# Patient Record
Sex: MALE | Race: WHITE | NOT HISPANIC OR LATINO | Employment: FULL TIME | URBAN - METROPOLITAN AREA
[De-identification: names, ages, dates, MRNs, and addresses within clinical notes are randomized per-mention and may not be internally consistent; named-entity substitution may affect disease eponyms.]

---

## 2021-08-03 ENCOUNTER — TRANSCRIBE ORDERS (OUTPATIENT)
Dept: ADMINISTRATIVE | Facility: HOSPITAL | Age: 56
End: 2021-08-03

## 2021-08-03 DIAGNOSIS — N32.1 COLOVESICAL FISTULA: Primary | ICD-10-CM

## 2021-08-04 ENCOUNTER — APPOINTMENT (OUTPATIENT)
Dept: CT IMAGING | Facility: HOSPITAL | Age: 56
End: 2021-08-04

## 2021-08-04 ENCOUNTER — HOSPITAL ENCOUNTER (OUTPATIENT)
Dept: CT IMAGING | Facility: HOSPITAL | Age: 56
Discharge: HOME | End: 2021-08-04
Admitting: COLON & RECTAL SURGERY

## 2021-08-04 DIAGNOSIS — N32.1 COLOVESICAL FISTULA: ICD-10-CM

## 2021-08-04 PROCEDURE — 0 DIATRIZOATE MEGLUMINE & SODIUM PER 1 ML: Performed by: COLON & RECTAL SURGERY

## 2021-08-04 PROCEDURE — 25010000002 IOPAMIDOL 61 % SOLUTION: Performed by: COLON & RECTAL SURGERY

## 2021-08-04 PROCEDURE — 74177 CT ABD & PELVIS W/CONTRAST: CPT

## 2021-08-04 RX ADMIN — DIATRIZOATE MEGLUMINE AND DIATRIZOATE SODIUM 15 ML: 660; 100 LIQUID ORAL; RECTAL at 13:45

## 2021-08-04 RX ADMIN — IOPAMIDOL 100 ML: 612 INJECTION, SOLUTION INTRAVENOUS at 15:05

## 2021-08-16 ENCOUNTER — PRE-ADMISSION TESTING (OUTPATIENT)
Dept: PREADMISSION TESTING | Facility: HOSPITAL | Age: 56
End: 2021-08-16

## 2021-08-16 ENCOUNTER — ANESTHESIA EVENT (OUTPATIENT)
Dept: PERIOP | Facility: HOSPITAL | Age: 56
End: 2021-08-16

## 2021-08-16 VITALS — HEIGHT: 72 IN | WEIGHT: 256.62 LBS | BODY MASS INDEX: 34.76 KG/M2

## 2021-08-16 LAB
ABO GROUP BLD: NORMAL
ALBUMIN SERPL-MCNC: 4 G/DL (ref 3.5–5.2)
ALBUMIN/GLOB SERPL: 1.2 G/DL
ALP SERPL-CCNC: 91 U/L (ref 39–117)
ALT SERPL W P-5'-P-CCNC: 10 U/L (ref 1–41)
ANION GAP SERPL CALCULATED.3IONS-SCNC: 12 MMOL/L (ref 5–15)
AST SERPL-CCNC: 20 U/L (ref 1–40)
BILIRUB SERPL-MCNC: 0.4 MG/DL (ref 0–1.2)
BLD GP AB SCN SERPL QL: NEGATIVE
BUN SERPL-MCNC: 8 MG/DL (ref 6–20)
BUN/CREAT SERPL: 10.5 (ref 7–25)
CALCIUM SPEC-SCNC: 9.5 MG/DL (ref 8.6–10.5)
CHLORIDE SERPL-SCNC: 101 MMOL/L (ref 98–107)
CO2 SERPL-SCNC: 23 MMOL/L (ref 22–29)
CREAT SERPL-MCNC: 0.76 MG/DL (ref 0.76–1.27)
DEPRECATED RDW RBC AUTO: 44.2 FL (ref 37–54)
ERYTHROCYTE [DISTWIDTH] IN BLOOD BY AUTOMATED COUNT: 13.5 % (ref 12.3–15.4)
FLUAV SUBTYP SPEC NAA+PROBE: NOT DETECTED
FLUBV RNA ISLT QL NAA+PROBE: NOT DETECTED
GFR SERPL CREATININE-BSD FRML MDRD: 107 ML/MIN/1.73
GLOBULIN UR ELPH-MCNC: 3.3 GM/DL
GLUCOSE SERPL-MCNC: 164 MG/DL (ref 65–99)
HBA1C MFR BLD: 8 % (ref 4.8–5.6)
HCT VFR BLD AUTO: 51.3 % (ref 37.5–51)
HGB BLD-MCNC: 17 G/DL (ref 13–17.7)
MCH RBC QN AUTO: 29.6 PG (ref 26.6–33)
MCHC RBC AUTO-ENTMCNC: 33.1 G/DL (ref 31.5–35.7)
MCV RBC AUTO: 89.4 FL (ref 79–97)
PLATELET # BLD AUTO: 252 10*3/MM3 (ref 140–450)
PMV BLD AUTO: 10.2 FL (ref 6–12)
POTASSIUM SERPL-SCNC: 3.7 MMOL/L (ref 3.5–5.2)
PROT SERPL-MCNC: 7.3 G/DL (ref 6–8.5)
QT INTERVAL: 342 MS
QTC INTERVAL: 454 MS
RBC # BLD AUTO: 5.74 10*6/MM3 (ref 4.14–5.8)
RH BLD: POSITIVE
SARS-COV-2 RNA PNL SPEC NAA+PROBE: NOT DETECTED
SODIUM SERPL-SCNC: 136 MMOL/L (ref 136–145)
T&S EXPIRATION DATE: NORMAL
WBC # BLD AUTO: 14.83 10*3/MM3 (ref 3.4–10.8)

## 2021-08-16 PROCEDURE — 85027 COMPLETE CBC AUTOMATED: CPT

## 2021-08-16 PROCEDURE — 80053 COMPREHEN METABOLIC PANEL: CPT

## 2021-08-16 PROCEDURE — 86900 BLOOD TYPING SEROLOGIC ABO: CPT

## 2021-08-16 PROCEDURE — 36415 COLL VENOUS BLD VENIPUNCTURE: CPT

## 2021-08-16 PROCEDURE — 83036 HEMOGLOBIN GLYCOSYLATED A1C: CPT

## 2021-08-16 PROCEDURE — 93010 ELECTROCARDIOGRAM REPORT: CPT | Performed by: INTERNAL MEDICINE

## 2021-08-16 PROCEDURE — C9803 HOPD COVID-19 SPEC COLLECT: HCPCS

## 2021-08-16 PROCEDURE — 93005 ELECTROCARDIOGRAM TRACING: CPT

## 2021-08-16 PROCEDURE — 86901 BLOOD TYPING SEROLOGIC RH(D): CPT

## 2021-08-16 PROCEDURE — 86850 RBC ANTIBODY SCREEN: CPT

## 2021-08-16 PROCEDURE — 87636 SARSCOV2 & INF A&B AMP PRB: CPT

## 2021-08-16 RX ORDER — LEVOFLOXACIN 750 MG/1
750 TABLET ORAL DAILY
COMMUNITY
End: 2021-08-20 | Stop reason: HOSPADM

## 2021-08-16 RX ORDER — METRONIDAZOLE 500 MG/1
500 TABLET ORAL 3 TIMES DAILY
COMMUNITY
End: 2021-08-20 | Stop reason: HOSPADM

## 2021-08-16 RX ORDER — FAMOTIDINE 10 MG/ML
20 INJECTION, SOLUTION INTRAVENOUS ONCE
Status: CANCELLED | OUTPATIENT
Start: 2021-08-16 | End: 2021-08-16

## 2021-08-16 RX ORDER — NICOTINE 21 MG/24HR
1 PATCH, TRANSDERMAL 24 HOURS TRANSDERMAL EVERY 24 HOURS
COMMUNITY

## 2021-08-16 RX ORDER — ALLOPURINOL 100 MG/1
100 TABLET ORAL DAILY
COMMUNITY

## 2021-08-16 RX ORDER — AMLODIPINE BESYLATE 5 MG/1
5 TABLET ORAL DAILY
COMMUNITY

## 2021-08-16 NOTE — PAT
Patient to apply Chlorhexadine wipes  to surgical area (as instructed) the night before procedure and the AM of procedure. Wipes provided.    Blood bank bracelet applied to patient during Pre Admission Testing visit.  Patient instructed not to remove from arm until after procedure and they are discharged from the hospital.  Explained to patient that they may shower and get the bracelet wet, but not to immerse under water for longer periods (bathing, swimming, hand dishwashing, etc).  Patient verbalized understanding.    EKG in chart.    Stoma nurse seeing patient now.       Left  for Jessy Little with information about patient's surgery.

## 2021-08-17 ENCOUNTER — HOSPITAL ENCOUNTER (INPATIENT)
Facility: HOSPITAL | Age: 56
LOS: 3 days | Discharge: HOME HEALTH | End: 2021-08-20
Attending: COLON & RECTAL SURGERY | Admitting: COLON & RECTAL SURGERY

## 2021-08-17 ENCOUNTER — ANESTHESIA EVENT CONVERTED (OUTPATIENT)
Dept: ANESTHESIOLOGY | Facility: HOSPITAL | Age: 56
End: 2021-08-17

## 2021-08-17 ENCOUNTER — ANESTHESIA (OUTPATIENT)
Dept: PERIOP | Facility: HOSPITAL | Age: 56
End: 2021-08-17

## 2021-08-17 DIAGNOSIS — Z90.49 S/P COLON RESECTION: Primary | ICD-10-CM

## 2021-08-17 DIAGNOSIS — N32.1 COLOVESICAL FISTULA: ICD-10-CM

## 2021-08-17 DIAGNOSIS — D72.829 LEUKOCYTOSIS, UNSPECIFIED TYPE: ICD-10-CM

## 2021-08-17 PROBLEM — E11.9 DM2 (DIABETES MELLITUS, TYPE 2): Status: ACTIVE | Noted: 2021-08-17

## 2021-08-17 PROBLEM — I10 HYPERTENSION: Status: ACTIVE | Noted: 2021-08-17

## 2021-08-17 PROBLEM — K57.92 DIVERTICULITIS: Status: ACTIVE | Noted: 2021-08-17

## 2021-08-17 LAB
ABO GROUP BLD: NORMAL
GLUCOSE BLDC GLUCOMTR-MCNC: 183 MG/DL (ref 70–130)
GLUCOSE BLDC GLUCOMTR-MCNC: 188 MG/DL (ref 70–130)
GLUCOSE BLDC GLUCOMTR-MCNC: 233 MG/DL (ref 70–130)
RH BLD: POSITIVE

## 2021-08-17 PROCEDURE — C1889 IMPLANT/INSERT DEVICE, NOC: HCPCS | Performed by: COLON & RECTAL SURGERY

## 2021-08-17 PROCEDURE — 87116 MYCOBACTERIA CULTURE: CPT | Performed by: COLON & RECTAL SURGERY

## 2021-08-17 PROCEDURE — 63710000001 INSULIN DETEMIR PER 5 UNITS

## 2021-08-17 PROCEDURE — 87077 CULTURE AEROBIC IDENTIFY: CPT | Performed by: COLON & RECTAL SURGERY

## 2021-08-17 PROCEDURE — 25010000002 NEOSTIGMINE 10 MG/10ML SOLUTION: Performed by: NURSE ANESTHETIST, CERTIFIED REGISTERED

## 2021-08-17 PROCEDURE — 25010000002 FENTANYL CITRATE (PF) 50 MCG/ML SOLUTION: Performed by: NURSE ANESTHETIST, CERTIFIED REGISTERED

## 2021-08-17 PROCEDURE — 87070 CULTURE OTHR SPECIMN AEROBIC: CPT | Performed by: COLON & RECTAL SURGERY

## 2021-08-17 PROCEDURE — 0DTJ4ZZ RESECTION OF APPENDIX, PERCUTANEOUS ENDOSCOPIC APPROACH: ICD-10-PCS | Performed by: COLON & RECTAL SURGERY

## 2021-08-17 PROCEDURE — P9041 ALBUMIN (HUMAN),5%, 50ML: HCPCS | Performed by: NURSE ANESTHETIST, CERTIFIED REGISTERED

## 2021-08-17 PROCEDURE — 0DBN4ZZ EXCISION OF SIGMOID COLON, PERCUTANEOUS ENDOSCOPIC APPROACH: ICD-10-PCS | Performed by: COLON & RECTAL SURGERY

## 2021-08-17 PROCEDURE — 25010000002 SODIUM CHLORIDE 0.9 % WITH KCL 20 MEQ 20-0.9 MEQ/L-% SOLUTION: Performed by: COLON & RECTAL SURGERY

## 2021-08-17 PROCEDURE — 87205 SMEAR GRAM STAIN: CPT | Performed by: COLON & RECTAL SURGERY

## 2021-08-17 PROCEDURE — 0T788DZ DILATION OF BILATERAL URETERS WITH INTRALUMINAL DEVICE, VIA NATURAL OR ARTIFICIAL OPENING ENDOSCOPIC: ICD-10-PCS | Performed by: UROLOGY

## 2021-08-17 PROCEDURE — 87075 CULTR BACTERIA EXCEPT BLOOD: CPT | Performed by: COLON & RECTAL SURGERY

## 2021-08-17 PROCEDURE — 87206 SMEAR FLUORESCENT/ACID STAI: CPT | Performed by: COLON & RECTAL SURGERY

## 2021-08-17 PROCEDURE — 25010000002 HEPARIN (PORCINE) PER 1000 UNITS: Performed by: COLON & RECTAL SURGERY

## 2021-08-17 PROCEDURE — C1758 CATHETER, URETERAL: HCPCS | Performed by: COLON & RECTAL SURGERY

## 2021-08-17 PROCEDURE — 25010000002 PROPOFOL 10 MG/ML EMULSION: Performed by: NURSE ANESTHETIST, CERTIFIED REGISTERED

## 2021-08-17 PROCEDURE — 87176 TISSUE HOMOGENIZATION CULTR: CPT | Performed by: COLON & RECTAL SURGERY

## 2021-08-17 PROCEDURE — 02HV33Z INSERTION OF INFUSION DEVICE INTO SUPERIOR VENA CAVA, PERCUTANEOUS APPROACH: ICD-10-PCS | Performed by: INTERNAL MEDICINE

## 2021-08-17 PROCEDURE — 25010000002 PIPERACILLIN SOD-TAZOBACTAM PER 1 G: Performed by: COLON & RECTAL SURGERY

## 2021-08-17 PROCEDURE — 87186 SC STD MICRODIL/AGAR DIL: CPT | Performed by: COLON & RECTAL SURGERY

## 2021-08-17 PROCEDURE — 25010000002 HYDROMORPHONE PER 4 MG: Performed by: NURSE ANESTHETIST, CERTIFIED REGISTERED

## 2021-08-17 PROCEDURE — 25010000002 ALBUMIN HUMAN 5% PER 50 ML: Performed by: NURSE ANESTHETIST, CERTIFIED REGISTERED

## 2021-08-17 PROCEDURE — 0D1B0Z4 BYPASS ILEUM TO CUTANEOUS, OPEN APPROACH: ICD-10-PCS | Performed by: COLON & RECTAL SURGERY

## 2021-08-17 PROCEDURE — 88307 TISSUE EXAM BY PATHOLOGIST: CPT | Performed by: COLON & RECTAL SURGERY

## 2021-08-17 PROCEDURE — 25010000002 DIAZEPAM PER 5 MG: Performed by: INTERNAL MEDICINE

## 2021-08-17 PROCEDURE — 25010000002 HYDROMORPHONE PER 4 MG: Performed by: COLON & RECTAL SURGERY

## 2021-08-17 PROCEDURE — 86900 BLOOD TYPING SEROLOGIC ABO: CPT

## 2021-08-17 PROCEDURE — 25010000002 MIDAZOLAM PER 1 MG: Performed by: ANESTHESIOLOGY

## 2021-08-17 PROCEDURE — 88302 TISSUE EXAM BY PATHOLOGIST: CPT | Performed by: COLON & RECTAL SURGERY

## 2021-08-17 PROCEDURE — 63710000001 INSULIN LISPRO (HUMAN) PER 5 UNITS

## 2021-08-17 PROCEDURE — 25010000002 DEXAMETHASONE SODIUM PHOSPHATE 10 MG/ML SOLUTION: Performed by: NURSE ANESTHETIST, CERTIFIED REGISTERED

## 2021-08-17 PROCEDURE — 87102 FUNGUS ISOLATION CULTURE: CPT | Performed by: COLON & RECTAL SURGERY

## 2021-08-17 PROCEDURE — 25010000002 ONDANSETRON PER 1 MG: Performed by: COLON & RECTAL SURGERY

## 2021-08-17 PROCEDURE — 82962 GLUCOSE BLOOD TEST: CPT

## 2021-08-17 PROCEDURE — 25010000002 ONDANSETRON PER 1 MG: Performed by: NURSE ANESTHETIST, CERTIFIED REGISTERED

## 2021-08-17 PROCEDURE — 86901 BLOOD TYPING SEROLOGIC RH(D): CPT

## 2021-08-17 PROCEDURE — 0TBB4ZZ EXCISION OF BLADDER, PERCUTANEOUS ENDOSCOPIC APPROACH: ICD-10-PCS | Performed by: COLON & RECTAL SURGERY

## 2021-08-17 PROCEDURE — 25010000002 ERTAPENEM 1 GM/100ML SOLUTION: Performed by: COLON & RECTAL SURGERY

## 2021-08-17 PROCEDURE — 25010000002 KETOROLAC TROMETHAMINE PER 15 MG: Performed by: COLON & RECTAL SURGERY

## 2021-08-17 PROCEDURE — 94799 UNLISTED PULMONARY SVC/PX: CPT

## 2021-08-17 DEVICE — THE ECHELON, ECHELON ENDOPATH™ AND ECHELON FLEX™ FAMILIES OF ENDOSCOPIC LINEAR CUTTERS AND RELOADS ARE STERILE, SINGLE PATIENT USE INSTRUMENTS THAT SIMULTANEOUSLY CUT AND STAPLE TISSUE. THERE ARE SIX STAGGERED ROWS OF STAPLES, THREE ON EITHER SIDE OF THE CUT LINE. THE 45 MM INSTRUMENTS HAVE A STAPLE LINE THATIS APPROXIMATELY 45 MM LONG AND A CUT LINE THAT IS APPROXIMATELY 42 MM LONG. THE SHAFT CAN ROTATE FREELY IN BOTH DIRECTIONS AND AN ARTICULATION MECHANISM ON ARTICULATING INSTRUMENTS ENABLES BENDING THE DISTAL PORTIONOF THE SHAFT TO FACILITATE LATERAL ACCESS OF THE OPERATIVE SITE.THE INSTRUMENTS ARE SHIPPED WITHOUT A RELOAD AND MUST BE LOADED PRIOR TO USE. A STAPLE RETAINING CAP ON THE RELOAD PROTECTS THE STAPLE LEG POINTS DURING SHIPPING AND TRANSPORTATION. THE INSTRUMENTS’ LOCK-OUT FEATURE IS DESIGNED TO PREVENT A USED RELOAD FROM BEING REFIRED.
Type: IMPLANTABLE DEVICE | Site: ABDOMEN | Status: FUNCTIONAL
Brand: ECHELON ENDOPATH

## 2021-08-17 DEVICE — CELLULAR STAPLER WITH TRI-STAPLE TECHNOLOGY
Type: IMPLANTABLE DEVICE | Site: ABDOMEN | Status: FUNCTIONAL
Brand: EEA

## 2021-08-17 RX ORDER — MELOXICAM 15 MG/1
15 TABLET ORAL ONCE
Status: COMPLETED | OUTPATIENT
Start: 2021-08-17 | End: 2021-08-17

## 2021-08-17 RX ORDER — IBUPROFEN 400 MG/1
400 TABLET ORAL EVERY 6 HOURS PRN
Status: DISCONTINUED | OUTPATIENT
Start: 2021-08-17 | End: 2021-08-20 | Stop reason: HOSPADM

## 2021-08-17 RX ORDER — ONDANSETRON 2 MG/ML
INJECTION INTRAMUSCULAR; INTRAVENOUS AS NEEDED
Status: DISCONTINUED | OUTPATIENT
Start: 2021-08-17 | End: 2021-08-17 | Stop reason: SURG

## 2021-08-17 RX ORDER — ONDANSETRON 2 MG/ML
4 INJECTION INTRAMUSCULAR; INTRAVENOUS EVERY 6 HOURS PRN
Status: DISCONTINUED | OUTPATIENT
Start: 2021-08-17 | End: 2021-08-17 | Stop reason: SDUPTHER

## 2021-08-17 RX ORDER — LABETALOL HYDROCHLORIDE 5 MG/ML
10 INJECTION, SOLUTION INTRAVENOUS EVERY 4 HOURS PRN
Status: DISCONTINUED | OUTPATIENT
Start: 2021-08-17 | End: 2021-08-20 | Stop reason: HOSPADM

## 2021-08-17 RX ORDER — FAMOTIDINE 20 MG/1
20 TABLET, FILM COATED ORAL 2 TIMES DAILY
Status: DISCONTINUED | OUTPATIENT
Start: 2021-08-17 | End: 2021-08-20 | Stop reason: HOSPADM

## 2021-08-17 RX ORDER — HYDROCODONE BITARTRATE AND ACETAMINOPHEN 7.5; 325 MG/1; MG/1
1 TABLET ORAL EVERY 4 HOURS PRN
Status: DISCONTINUED | OUTPATIENT
Start: 2021-08-17 | End: 2021-08-20 | Stop reason: HOSPADM

## 2021-08-17 RX ORDER — SODIUM CHLORIDE 0.9 % (FLUSH) 0.9 %
10 SYRINGE (ML) INJECTION EVERY 12 HOURS SCHEDULED
Status: DISCONTINUED | OUTPATIENT
Start: 2021-08-17 | End: 2021-08-17 | Stop reason: HOSPADM

## 2021-08-17 RX ORDER — DIAZEPAM 5 MG/1
5 TABLET ORAL EVERY 6 HOURS PRN
Status: DISCONTINUED | OUTPATIENT
Start: 2021-08-17 | End: 2021-08-20 | Stop reason: HOSPADM

## 2021-08-17 RX ORDER — LIDOCAINE HYDROCHLORIDE 10 MG/ML
0.5 INJECTION, SOLUTION EPIDURAL; INFILTRATION; INTRACAUDAL; PERINEURAL ONCE AS NEEDED
Status: COMPLETED | OUTPATIENT
Start: 2021-08-17 | End: 2021-08-17

## 2021-08-17 RX ORDER — PROMETHAZINE HYDROCHLORIDE 25 MG/1
25 TABLET ORAL ONCE AS NEEDED
Status: DISCONTINUED | OUTPATIENT
Start: 2021-08-17 | End: 2021-08-17 | Stop reason: HOSPADM

## 2021-08-17 RX ORDER — ONDANSETRON 2 MG/ML
4 INJECTION INTRAMUSCULAR; INTRAVENOUS EVERY 6 HOURS PRN
Status: DISCONTINUED | OUTPATIENT
Start: 2021-08-17 | End: 2021-08-20 | Stop reason: HOSPADM

## 2021-08-17 RX ORDER — ROCURONIUM BROMIDE 10 MG/ML
INJECTION, SOLUTION INTRAVENOUS AS NEEDED
Status: DISCONTINUED | OUTPATIENT
Start: 2021-08-17 | End: 2021-08-17 | Stop reason: SURG

## 2021-08-17 RX ORDER — BUPIVACAINE HCL/0.9 % NACL/PF 0.125 %
PLASTIC BAG, INJECTION (ML) EPIDURAL AS NEEDED
Status: DISCONTINUED | OUTPATIENT
Start: 2021-08-17 | End: 2021-08-17 | Stop reason: SURG

## 2021-08-17 RX ORDER — DROPERIDOL 2.5 MG/ML
0.62 INJECTION, SOLUTION INTRAMUSCULAR; INTRAVENOUS AS NEEDED
Status: DISCONTINUED | OUTPATIENT
Start: 2021-08-17 | End: 2021-08-17 | Stop reason: HOSPADM

## 2021-08-17 RX ORDER — KETOROLAC TROMETHAMINE 15 MG/ML
15 INJECTION, SOLUTION INTRAMUSCULAR; INTRAVENOUS EVERY 6 HOURS
Status: COMPLETED | OUTPATIENT
Start: 2021-08-17 | End: 2021-08-19

## 2021-08-17 RX ORDER — SODIUM CHLORIDE AND POTASSIUM CHLORIDE 150; 900 MG/100ML; MG/100ML
100 INJECTION, SOLUTION INTRAVENOUS CONTINUOUS
Status: DISCONTINUED | OUTPATIENT
Start: 2021-08-17 | End: 2021-08-20 | Stop reason: HOSPADM

## 2021-08-17 RX ORDER — DROPERIDOL 2.5 MG/ML
0.62 INJECTION, SOLUTION INTRAMUSCULAR; INTRAVENOUS ONCE AS NEEDED
Status: DISCONTINUED | OUTPATIENT
Start: 2021-08-17 | End: 2021-08-17 | Stop reason: HOSPADM

## 2021-08-17 RX ORDER — ACETAMINOPHEN 500 MG
1000 TABLET ORAL ONCE
Status: COMPLETED | OUTPATIENT
Start: 2021-08-17 | End: 2021-08-17

## 2021-08-17 RX ORDER — ALBUMIN, HUMAN INJ 5% 5 %
SOLUTION INTRAVENOUS CONTINUOUS PRN
Status: DISCONTINUED | OUTPATIENT
Start: 2021-08-17 | End: 2021-08-17 | Stop reason: SURG

## 2021-08-17 RX ORDER — ALLOPURINOL 100 MG/1
100 TABLET ORAL DAILY
Status: DISCONTINUED | OUTPATIENT
Start: 2021-08-18 | End: 2021-08-20 | Stop reason: HOSPADM

## 2021-08-17 RX ORDER — ONDANSETRON 2 MG/ML
4 INJECTION INTRAMUSCULAR; INTRAVENOUS ONCE AS NEEDED
Status: DISCONTINUED | OUTPATIENT
Start: 2021-08-17 | End: 2021-08-17 | Stop reason: HOSPADM

## 2021-08-17 RX ORDER — IPRATROPIUM BROMIDE AND ALBUTEROL SULFATE 2.5; .5 MG/3ML; MG/3ML
3 SOLUTION RESPIRATORY (INHALATION) ONCE AS NEEDED
Status: DISCONTINUED | OUTPATIENT
Start: 2021-08-17 | End: 2021-08-17 | Stop reason: HOSPADM

## 2021-08-17 RX ORDER — NALOXONE HCL 0.4 MG/ML
0.4 VIAL (ML) INJECTION AS NEEDED
Status: DISCONTINUED | OUTPATIENT
Start: 2021-08-17 | End: 2021-08-17 | Stop reason: HOSPADM

## 2021-08-17 RX ORDER — MAGNESIUM HYDROXIDE 1200 MG/15ML
LIQUID ORAL AS NEEDED
Status: DISCONTINUED | OUTPATIENT
Start: 2021-08-17 | End: 2021-08-17 | Stop reason: HOSPADM

## 2021-08-17 RX ORDER — SODIUM CHLORIDE 0.9 % (FLUSH) 0.9 %
3 SYRINGE (ML) INJECTION EVERY 12 HOURS SCHEDULED
Status: DISCONTINUED | OUTPATIENT
Start: 2021-08-17 | End: 2021-08-17 | Stop reason: HOSPADM

## 2021-08-17 RX ORDER — PROMETHAZINE HYDROCHLORIDE 25 MG/1
25 SUPPOSITORY RECTAL ONCE AS NEEDED
Status: DISCONTINUED | OUTPATIENT
Start: 2021-08-17 | End: 2021-08-17 | Stop reason: HOSPADM

## 2021-08-17 RX ORDER — DEXTROSE MONOHYDRATE 25 G/50ML
25 INJECTION, SOLUTION INTRAVENOUS
Status: DISCONTINUED | OUTPATIENT
Start: 2021-08-17 | End: 2021-08-20 | Stop reason: HOSPADM

## 2021-08-17 RX ORDER — LABETALOL HYDROCHLORIDE 5 MG/ML
5 INJECTION, SOLUTION INTRAVENOUS
Status: DISCONTINUED | OUTPATIENT
Start: 2021-08-17 | End: 2021-08-17 | Stop reason: HOSPADM

## 2021-08-17 RX ORDER — DEXAMETHASONE SODIUM PHOSPHATE 10 MG/ML
INJECTION, SOLUTION INTRAMUSCULAR; INTRAVENOUS
Status: COMPLETED | OUTPATIENT
Start: 2021-08-17 | End: 2021-08-17

## 2021-08-17 RX ORDER — GLYCOPYRROLATE 0.2 MG/ML
INJECTION INTRAMUSCULAR; INTRAVENOUS AS NEEDED
Status: DISCONTINUED | OUTPATIENT
Start: 2021-08-17 | End: 2021-08-17 | Stop reason: SURG

## 2021-08-17 RX ORDER — HYDROCODONE BITARTRATE AND ACETAMINOPHEN 5; 325 MG/1; MG/1
1 TABLET ORAL ONCE AS NEEDED
Status: DISCONTINUED | OUTPATIENT
Start: 2021-08-17 | End: 2021-08-17 | Stop reason: HOSPADM

## 2021-08-17 RX ORDER — SODIUM CHLORIDE, SODIUM LACTATE, POTASSIUM CHLORIDE, CALCIUM CHLORIDE 600; 310; 30; 20 MG/100ML; MG/100ML; MG/100ML; MG/100ML
9 INJECTION, SOLUTION INTRAVENOUS CONTINUOUS
Status: DISCONTINUED | OUTPATIENT
Start: 2021-08-17 | End: 2021-08-20 | Stop reason: HOSPADM

## 2021-08-17 RX ORDER — FENTANYL CITRATE 50 UG/ML
INJECTION, SOLUTION INTRAMUSCULAR; INTRAVENOUS AS NEEDED
Status: DISCONTINUED | OUTPATIENT
Start: 2021-08-17 | End: 2021-08-17 | Stop reason: SURG

## 2021-08-17 RX ORDER — PROPOFOL 10 MG/ML
VIAL (ML) INTRAVENOUS AS NEEDED
Status: DISCONTINUED | OUTPATIENT
Start: 2021-08-17 | End: 2021-08-17 | Stop reason: SURG

## 2021-08-17 RX ORDER — HYDROMORPHONE HYDROCHLORIDE 1 MG/ML
0.5 INJECTION, SOLUTION INTRAMUSCULAR; INTRAVENOUS; SUBCUTANEOUS
Status: DISCONTINUED | OUTPATIENT
Start: 2021-08-17 | End: 2021-08-17 | Stop reason: HOSPADM

## 2021-08-17 RX ORDER — HEPARIN SODIUM 5000 [USP'U]/ML
5000 INJECTION, SOLUTION INTRAVENOUS; SUBCUTANEOUS ONCE
Status: COMPLETED | OUTPATIENT
Start: 2021-08-17 | End: 2021-08-17

## 2021-08-17 RX ORDER — HYDRALAZINE HYDROCHLORIDE 20 MG/ML
5 INJECTION INTRAMUSCULAR; INTRAVENOUS
Status: DISCONTINUED | OUTPATIENT
Start: 2021-08-17 | End: 2021-08-17 | Stop reason: HOSPADM

## 2021-08-17 RX ORDER — SCOLOPAMINE TRANSDERMAL SYSTEM 1 MG/1
1 PATCH, EXTENDED RELEASE TRANSDERMAL CONTINUOUS
Status: ACTIVE | OUTPATIENT
Start: 2021-08-17 | End: 2021-08-20

## 2021-08-17 RX ORDER — DIAZEPAM 5 MG/ML
5 INJECTION, SOLUTION INTRAMUSCULAR; INTRAVENOUS ONCE
Status: COMPLETED | OUTPATIENT
Start: 2021-08-17 | End: 2021-08-17

## 2021-08-17 RX ORDER — HEPARIN SODIUM 5000 [USP'U]/ML
5000 INJECTION, SOLUTION INTRAVENOUS; SUBCUTANEOUS EVERY 8 HOURS SCHEDULED
Status: DISCONTINUED | OUTPATIENT
Start: 2021-08-18 | End: 2021-08-20 | Stop reason: HOSPADM

## 2021-08-17 RX ORDER — ONDANSETRON 4 MG/1
4 TABLET, FILM COATED ORAL EVERY 6 HOURS PRN
Status: DISCONTINUED | OUTPATIENT
Start: 2021-08-17 | End: 2021-08-20 | Stop reason: HOSPADM

## 2021-08-17 RX ORDER — AMLODIPINE BESYLATE 5 MG/1
5 TABLET ORAL DAILY
Status: DISCONTINUED | OUTPATIENT
Start: 2021-08-18 | End: 2021-08-20 | Stop reason: HOSPADM

## 2021-08-17 RX ORDER — FAMOTIDINE 20 MG/1
20 TABLET, FILM COATED ORAL ONCE
Status: COMPLETED | OUTPATIENT
Start: 2021-08-17 | End: 2021-08-17

## 2021-08-17 RX ORDER — SODIUM CHLORIDE 0.9 % (FLUSH) 0.9 %
3-10 SYRINGE (ML) INJECTION AS NEEDED
Status: DISCONTINUED | OUTPATIENT
Start: 2021-08-17 | End: 2021-08-17 | Stop reason: HOSPADM

## 2021-08-17 RX ORDER — FENTANYL CITRATE 50 UG/ML
50 INJECTION, SOLUTION INTRAMUSCULAR; INTRAVENOUS
Status: COMPLETED | OUTPATIENT
Start: 2021-08-17 | End: 2021-08-17

## 2021-08-17 RX ORDER — NEOSTIGMINE METHYLSULFATE 1 MG/ML
INJECTION, SOLUTION INTRAVENOUS AS NEEDED
Status: DISCONTINUED | OUTPATIENT
Start: 2021-08-17 | End: 2021-08-17 | Stop reason: SURG

## 2021-08-17 RX ORDER — MEPERIDINE HYDROCHLORIDE 25 MG/ML
12.5 INJECTION INTRAMUSCULAR; INTRAVENOUS; SUBCUTANEOUS
Status: DISCONTINUED | OUTPATIENT
Start: 2021-08-17 | End: 2021-08-17 | Stop reason: HOSPADM

## 2021-08-17 RX ORDER — ALVIMOPAN 12 MG/1
12 CAPSULE ORAL ONCE
Status: COMPLETED | OUTPATIENT
Start: 2021-08-17 | End: 2021-08-17

## 2021-08-17 RX ORDER — BUPIVACAINE HYDROCHLORIDE 2.5 MG/ML
INJECTION, SOLUTION EPIDURAL; INFILTRATION; INTRACAUDAL
Status: COMPLETED | OUTPATIENT
Start: 2021-08-17 | End: 2021-08-17

## 2021-08-17 RX ORDER — NICOTINE 21 MG/24HR
1 PATCH, TRANSDERMAL 24 HOURS TRANSDERMAL DAILY
Status: DISCONTINUED | OUTPATIENT
Start: 2021-08-18 | End: 2021-08-20 | Stop reason: HOSPADM

## 2021-08-17 RX ORDER — NICOTINE POLACRILEX 4 MG
15 LOZENGE BUCCAL
Status: DISCONTINUED | OUTPATIENT
Start: 2021-08-17 | End: 2021-08-20 | Stop reason: HOSPADM

## 2021-08-17 RX ORDER — NALOXONE HCL 0.4 MG/ML
0.4 VIAL (ML) INJECTION
Status: DISCONTINUED | OUTPATIENT
Start: 2021-08-17 | End: 2021-08-20 | Stop reason: HOSPADM

## 2021-08-17 RX ORDER — SODIUM CHLORIDE 9 MG/ML
INJECTION, SOLUTION INTRAVENOUS AS NEEDED
Status: DISCONTINUED | OUTPATIENT
Start: 2021-08-17 | End: 2021-08-17 | Stop reason: HOSPADM

## 2021-08-17 RX ORDER — HYDROMORPHONE HYDROCHLORIDE 1 MG/ML
0.5 INJECTION, SOLUTION INTRAMUSCULAR; INTRAVENOUS; SUBCUTANEOUS
Status: DISCONTINUED | OUTPATIENT
Start: 2021-08-17 | End: 2021-08-20 | Stop reason: HOSPADM

## 2021-08-17 RX ORDER — SODIUM CHLORIDE 0.9 % (FLUSH) 0.9 %
10 SYRINGE (ML) INJECTION AS NEEDED
Status: DISCONTINUED | OUTPATIENT
Start: 2021-08-17 | End: 2021-08-17 | Stop reason: HOSPADM

## 2021-08-17 RX ORDER — MIDAZOLAM HYDROCHLORIDE 1 MG/ML
1 INJECTION INTRAMUSCULAR; INTRAVENOUS
Status: COMPLETED | OUTPATIENT
Start: 2021-08-17 | End: 2021-08-17

## 2021-08-17 RX ORDER — LIDOCAINE HYDROCHLORIDE 10 MG/ML
INJECTION, SOLUTION EPIDURAL; INFILTRATION; INTRACAUDAL; PERINEURAL AS NEEDED
Status: DISCONTINUED | OUTPATIENT
Start: 2021-08-17 | End: 2021-08-17 | Stop reason: SURG

## 2021-08-17 RX ORDER — ACETAMINOPHEN 325 MG/1
650 TABLET ORAL EVERY 8 HOURS
Status: DISCONTINUED | OUTPATIENT
Start: 2021-08-17 | End: 2021-08-20 | Stop reason: HOSPADM

## 2021-08-17 RX ORDER — PREGABALIN 75 MG/1
75 CAPSULE ORAL ONCE
Status: COMPLETED | OUTPATIENT
Start: 2021-08-17 | End: 2021-08-17

## 2021-08-17 RX ADMIN — FENTANYL CITRATE 50 MCG: 50 INJECTION INTRAMUSCULAR; INTRAVENOUS at 11:45

## 2021-08-17 RX ADMIN — ACETAMINOPHEN 650 MG: 325 TABLET, FILM COATED ORAL at 16:32

## 2021-08-17 RX ADMIN — TAZOBACTAM SODIUM AND PIPERACILLIN SODIUM 3.38 G: 375; 3 INJECTION, SOLUTION INTRAVENOUS at 19:47

## 2021-08-17 RX ADMIN — PREGABALIN 75 MG: 75 CAPSULE ORAL at 06:59

## 2021-08-17 RX ADMIN — INSULIN LISPRO 3 UNITS: 100 INJECTION, SOLUTION INTRAVENOUS; SUBCUTANEOUS at 18:30

## 2021-08-17 RX ADMIN — ALBUMIN HUMAN: 0.05 INJECTION, SOLUTION INTRAVENOUS at 08:41

## 2021-08-17 RX ADMIN — NEOSTIGMINE METHYLSULFATE 2.5 MG: 0.5 INJECTION INTRAVENOUS at 11:11

## 2021-08-17 RX ADMIN — FENTANYL CITRATE 50 MCG: 50 INJECTION INTRAMUSCULAR; INTRAVENOUS at 12:15

## 2021-08-17 RX ADMIN — ALVIMOPAN 12 MG: 12 CAPSULE ORAL at 07:08

## 2021-08-17 RX ADMIN — HYDROMORPHONE HYDROCHLORIDE 0.5 MG: 1 INJECTION, SOLUTION INTRAMUSCULAR; INTRAVENOUS; SUBCUTANEOUS at 12:05

## 2021-08-17 RX ADMIN — KETOROLAC TROMETHAMINE 15 MG: 15 INJECTION, SOLUTION INTRAMUSCULAR; INTRAVENOUS at 20:58

## 2021-08-17 RX ADMIN — SODIUM CHLORIDE, POTASSIUM CHLORIDE, SODIUM LACTATE AND CALCIUM CHLORIDE: 600; 310; 30; 20 INJECTION, SOLUTION INTRAVENOUS at 09:59

## 2021-08-17 RX ADMIN — MIDAZOLAM HYDROCHLORIDE 1 MG: 1 INJECTION, SOLUTION INTRAMUSCULAR; INTRAVENOUS at 07:09

## 2021-08-17 RX ADMIN — HYDROMORPHONE HYDROCHLORIDE 0.5 MG: 1 INJECTION, SOLUTION INTRAMUSCULAR; INTRAVENOUS; SUBCUTANEOUS at 11:55

## 2021-08-17 RX ADMIN — FAMOTIDINE 20 MG: 20 TABLET ORAL at 20:58

## 2021-08-17 RX ADMIN — PROPOFOL 200 MG: 10 INJECTION, EMULSION INTRAVENOUS at 08:09

## 2021-08-17 RX ADMIN — FENTANYL CITRATE 100 MCG: 50 INJECTION, SOLUTION INTRAMUSCULAR; INTRAVENOUS at 08:09

## 2021-08-17 RX ADMIN — POTASSIUM CHLORIDE AND SODIUM CHLORIDE 100 ML/HR: 900; 150 INJECTION, SOLUTION INTRAVENOUS at 13:21

## 2021-08-17 RX ADMIN — FENTANYL CITRATE 50 MCG: 50 INJECTION, SOLUTION INTRAMUSCULAR; INTRAVENOUS at 11:11

## 2021-08-17 RX ADMIN — DIAZEPAM 5 MG: 5 TABLET ORAL at 13:05

## 2021-08-17 RX ADMIN — GLYCOPYRROLATE 0.4 MG: 0.2 INJECTION INTRAMUSCULAR; INTRAVENOUS at 11:11

## 2021-08-17 RX ADMIN — FENTANYL CITRATE 50 MCG: 50 INJECTION INTRAMUSCULAR; INTRAVENOUS at 12:10

## 2021-08-17 RX ADMIN — ACETAMINOPHEN 1000 MG: 500 TABLET, FILM COATED ORAL at 06:59

## 2021-08-17 RX ADMIN — LIDOCAINE HYDROCHLORIDE 0.5 ML: 10 INJECTION, SOLUTION EPIDURAL; INFILTRATION; INTRACAUDAL; PERINEURAL at 06:51

## 2021-08-17 RX ADMIN — ALBUMIN HUMAN: 0.05 INJECTION, SOLUTION INTRAVENOUS at 09:06

## 2021-08-17 RX ADMIN — HYDROMORPHONE HYDROCHLORIDE 0.5 MG: 1 INJECTION, SOLUTION INTRAMUSCULAR; INTRAVENOUS; SUBCUTANEOUS at 14:10

## 2021-08-17 RX ADMIN — PROPOFOL 25 MCG/KG/MIN: 10 INJECTION, EMULSION INTRAVENOUS at 08:16

## 2021-08-17 RX ADMIN — ROCURONIUM BROMIDE 25 MG: 10 INJECTION, SOLUTION INTRAVENOUS at 09:26

## 2021-08-17 RX ADMIN — SODIUM CHLORIDE, POTASSIUM CHLORIDE, SODIUM LACTATE AND CALCIUM CHLORIDE 9 ML/HR: 600; 310; 30; 20 INJECTION, SOLUTION INTRAVENOUS at 06:51

## 2021-08-17 RX ADMIN — SODIUM CHLORIDE, POTASSIUM CHLORIDE, SODIUM LACTATE AND CALCIUM CHLORIDE: 600; 310; 30; 20 INJECTION, SOLUTION INTRAVENOUS at 10:47

## 2021-08-17 RX ADMIN — ONDANSETRON 4 MG: 2 INJECTION INTRAMUSCULAR; INTRAVENOUS at 08:35

## 2021-08-17 RX ADMIN — FENTANYL CITRATE 50 MCG: 50 INJECTION, SOLUTION INTRAMUSCULAR; INTRAVENOUS at 09:28

## 2021-08-17 RX ADMIN — MICAFUNGIN SODIUM 100 MG: 100 INJECTION, POWDER, LYOPHILIZED, FOR SOLUTION INTRAVENOUS at 18:45

## 2021-08-17 RX ADMIN — Medication 100 MCG: at 09:08

## 2021-08-17 RX ADMIN — HYDROMORPHONE HYDROCHLORIDE 0.5 MG: 1 INJECTION, SOLUTION INTRAMUSCULAR; INTRAVENOUS; SUBCUTANEOUS at 12:20

## 2021-08-17 RX ADMIN — DEXAMETHASONE SODIUM PHOSPHATE 4 MG: 10 INJECTION, SOLUTION INTRAMUSCULAR; INTRAVENOUS at 08:12

## 2021-08-17 RX ADMIN — MELOXICAM 15 MG: 15 TABLET ORAL at 06:59

## 2021-08-17 RX ADMIN — LIDOCAINE HYDROCHLORIDE 50 MG: 10 INJECTION, SOLUTION EPIDURAL; INFILTRATION; INTRACAUDAL; PERINEURAL at 08:09

## 2021-08-17 RX ADMIN — HYDROMORPHONE HYDROCHLORIDE 0.5 MG: 1 INJECTION, SOLUTION INTRAMUSCULAR; INTRAVENOUS; SUBCUTANEOUS at 16:32

## 2021-08-17 RX ADMIN — MIDAZOLAM HYDROCHLORIDE 1 MG: 1 INJECTION, SOLUTION INTRAMUSCULAR; INTRAVENOUS at 07:21

## 2021-08-17 RX ADMIN — DIAZEPAM 5 MG: 5 INJECTION, SOLUTION INTRAMUSCULAR; INTRAVENOUS at 12:25

## 2021-08-17 RX ADMIN — ONDANSETRON 4 MG: 2 INJECTION INTRAMUSCULAR; INTRAVENOUS at 14:09

## 2021-08-17 RX ADMIN — KETOROLAC TROMETHAMINE 15 MG: 15 INJECTION, SOLUTION INTRAMUSCULAR; INTRAVENOUS at 13:05

## 2021-08-17 RX ADMIN — FENTANYL CITRATE 50 MCG: 50 INJECTION INTRAMUSCULAR; INTRAVENOUS at 11:50

## 2021-08-17 RX ADMIN — BUPIVACAINE HYDROCHLORIDE 60 ML: 2.5 INJECTION, SOLUTION EPIDURAL; INFILTRATION; INTRACAUDAL; PERINEURAL at 08:12

## 2021-08-17 RX ADMIN — HYDROCODONE BITARTRATE AND ACETAMINOPHEN 1 TABLET: 7.5; 325 TABLET ORAL at 13:05

## 2021-08-17 RX ADMIN — INSULIN DETEMIR 10 UNITS: 100 INJECTION, SOLUTION SUBCUTANEOUS at 21:00

## 2021-08-17 RX ADMIN — HYDROCODONE BITARTRATE AND ACETAMINOPHEN 1 TABLET: 7.5; 325 TABLET ORAL at 18:45

## 2021-08-17 RX ADMIN — FAMOTIDINE 20 MG: 20 TABLET ORAL at 06:59

## 2021-08-17 RX ADMIN — ROCURONIUM BROMIDE 50 MG: 10 INJECTION, SOLUTION INTRAVENOUS at 08:09

## 2021-08-17 RX ADMIN — ERTAPENEM SODIUM 1 G: 1 INJECTION, POWDER, LYOPHILIZED, FOR SOLUTION INTRAMUSCULAR; INTRAVENOUS at 08:08

## 2021-08-17 RX ADMIN — SCOPALAMINE 1 PATCH: 1 PATCH, EXTENDED RELEASE TRANSDERMAL at 06:59

## 2021-08-17 RX ADMIN — DEXAMETHASONE SODIUM PHOSPHATE 6 MG: 10 INJECTION, SOLUTION INTRAMUSCULAR; INTRAVENOUS at 08:25

## 2021-08-17 RX ADMIN — HEPARIN SODIUM 5000 UNITS: 5000 INJECTION, SOLUTION INTRAVENOUS; SUBCUTANEOUS at 07:00

## 2021-08-17 NOTE — OP NOTE
COLON RESECTION LAPAROSCOPIC SIGMOID OR LOW ANTERIOR  Procedure Note    Lawrence Christine Lieberman  8/17/2021    Pre-op Diagnosis:   Colovesical fistula    Post-op Diagnosis:     Colovesical fistula    Procedure/CPT® Codes:      Procedure(s):  Cystoscopy with bilateral ureteral stent placement    Surgeon(s):  Brayan Ley MD Slabaugh, Thomas Kirk Jr., MD    Anesthesia: General with Block    Staff:   Circulator: Stephany Villagran RN  Scrub Person: Yanick Hurst; Lorena Baldwin  Assistant: Maricel Kang RNFA    Assistant: Maricel Kang RNFA Was needed to assist in this case with retraction, exposure, instrument placement.    Estimated Blood Loss: minimal  Urine Voided: 300 mL    Specimens:                None      Drains:   Urethral Catheter Silicone 16 Fr. (Active)       Findings: Colovesical fistula    Complications: None    History: Intraoperative consult for patient with colovesical fistula.  Plan is for cystoscopy and placement of ureteral stents bilaterally for guidance during colorectal surgery.    Operative Report: I came into the room and the patient was under general anesthesia.  He was in the dorsolithotomy position with pressure points padded.  He was sterilely prepped and draped in normal fashion.  Scope inserted the patient urethra bladder atraumatically.  Cystoscopy was performed revealing colovesical fistula in the posterior bladder wall near the dome.  Both ureter orifices orthotopic in nature with clear reflux.  The cannulated ureteral orifice ease with ureteral stents bilaterally.  We used Pollick catheters to serve as ureteral stents.  They were advanced to 23 cm on each side.  Edelmann catheter was then placed and the stents were drained into the Edelmann catheter.  For details on the remainder of the operation see colorectal surgery operative note.    Jean Waggoner Jr., MD     Date: 8/17/2021  Time: 09:29 EDT

## 2021-08-17 NOTE — ANESTHESIA PROCEDURE NOTES
Peripheral Block      Patient reassessed immediately prior to procedure    Patient location during procedure: OR  Reason for block: at surgeon's request and post-op pain management  Performed by  CRNA: Malick Courtney III, CRNA  Preanesthetic Checklist  Completed: patient identified, IV checked, site marked, risks and benefits discussed, surgical consent, monitors and equipment checked, pre-op evaluation and timeout performed  Prep:  Pt Position: supine  Sterile barriers:cap, gloves, sterile barriers and mask  Prep: ChloraPrep  Patient monitoring: blood pressure monitoring, continuous pulse oximetry and EKG  Procedure  Sedation:yes  Performed under: general  Guidance:ultrasound guided  Images:still images obtained, printed/placed on chart    Laterality:Bilateral  Block Type:TAP  Injection Technique:single-shot  Needle Type:short-bevel and echogenic  Needle Gauge:20 G  Resistance on Injection: none    Medications Used: dexamethasone sodium phosphate injection, 4 mg  bupivacaine PF (MARCAINE) 0.25 % injection, 60 mL  Med admintered at 8/17/2021 8:12 AM      Medications  Comment:Block Injection:  LA dose divided between Right and Left block        Post Assessment  Injection Assessment: negative aspiration for heme, incremental injection and no paresthesia on injection  Patient Tolerance:comfortable throughout block  Complications:no  Additional Notes      Under Ultrasound guidance, a BBraun 4inch 360 degree needle was advanced with Normal Saline hydro dissection of tissue.  The Internal Oblique and Transversus Abdominus muscles where visualized.  At or before the aponeurosis of Internal Oblique, local anesthetic spread was visualized in the Transversus Abdominus Plane. Injection was made incrementally with aspiration every 5 mls.  There was no  intravascular injection,  injection pressure was normal, there was no neural injection, and the procedure was completed without difficulty.  Thank You.

## 2021-08-17 NOTE — PLAN OF CARE
Goal Outcome Evaluation:  VSS at the moment, 3L NC, alert and oriented. Pt ambulated from stretcher to bed with c/o increased incisional pain throughout the abd, PRNs administered, intervention effective. Incentive spirometer at bedside, sequential compression devices on. South output red tinged, amount adequate. Pt c/o nausea 1x, PRN administered, effective per pt. Updated spouse and daughter on plan of care, spouse at bedside. Will continue to monitor.   Problem: Adult Inpatient Plan of Care  Goal: Absence of Hospital-Acquired Illness or Injury  Outcome: Ongoing, Progressing  Intervention: Identify and Manage Fall Risk  Description: Perform standard risk assessment with a validated tool or comprehensive approach appropriate to the patient on admission; reassess fall risk frequently, with change in status or transfer to another level of care.  Communicate fall injury risk to interprofessional healthcare team.  Determine need for increased observation, equipment and environmental modification, such as low bed and signage, as well as supportive, nonskid footwear.  Adjust safety measures to individual developmental age, stage and identified risk factors.  Reinforce the importance of safety and physical activity with patient and family.  Perform regular intentional rounding to assess need for position change, pain assessment, personal needs, including assistance with toileting.  Recent Flowsheet Documentation  Taken 8/17/2021 1632 by Stephany Polanco, RN  Safety Promotion/Fall Prevention:  • assistive device/personal items within reach  • clutter free environment maintained  • fall prevention program maintained  • nonskid shoes/slippers when out of bed  • room organization consistent  • safety round/check completed  • toileting scheduled  Taken 8/17/2021 1410 by Stephany Polanco, RN  Safety Promotion/Fall Prevention:  • assistive device/personal items within reach  • clutter free environment maintained  • fall  prevention program maintained  • nonskid shoes/slippers when out of bed  • room organization consistent  • safety round/check completed  Taken 8/17/2021 1250 by Stephany Polanco RN  Safety Promotion/Fall Prevention:  • activity supervised  • assistive device/personal items within reach  • clutter free environment maintained  • fall prevention program maintained  • nonskid shoes/slippers when out of bed  • room organization consistent  • safety round/check completed  Intervention: Prevent Skin Injury  Description: Assess skin risk on admission and at regular intervals throughout hospital stay.  Keep all areas of skin (especially folds) clean and dry.  Maintain adequate skin hydration.  Relieve and redistribute pressure and protect bony prominences; implement measures based on patient-specific risk factors.  Match turning and repositioning schedule to clinical condition.  Encourage weight shift frequently; assist with reposition if unable to complete independently.  Float heels off bed. Avoid pressure on the Achilles tendon.  Keep skin free from extended contact with medical devices.  Use aids (e.g., slide boards, mechanical lift) during transfer.  Recent Flowsheet Documentation  Taken 8/17/2021 1632 by Stephany Polanco, RN  Body Position:  • position maintained  • supine  Skin Protection: adhesive use limited  Taken 8/17/2021 1410 by Stephany Polanco RN  Body Position: weight shift assistance provided  Skin Protection: adhesive use limited  Taken 8/17/2021 1250 by Stephany Polanco RN  Body Position: weight shift assistance provided  Skin Protection:  • adhesive use limited  • incontinence pads utilized  • transparent dressing maintained  • tubing/devices free from skin contact  Intervention: Prevent and Manage VTE (venous thromboembolism) Risk  Description: Assess for VTE risk.  Encourage/assist with early ambulation.  Initiate and maintain compression or other therapy, as indicated based on identified  risk in accordance with organizational protocol/provider order.  Encourage both active and passive leg exercises while in bed, if unable to ambulate.  Recent Flowsheet Documentation  Taken 8/17/2021 1250 by Stephany Polanco RN  VTE Prevention/Management:  • bilateral  • dorsiflexion/plantar flexion performed  • bleeding risk factor(s) identified  Intervention: Prevent Infection  Description: Maintain skin and mucous membrane integrity; promote hand, oral and pulmonary hygiene.  Optimize fluid balance, nutrition, sleep and glycemic control to maximize infection resistance.  Identify potential sources of infection early to prevent or mitigate progression of infection (e.g., wound, lines, devices).  Evaluate ongoing need for invasive devices; remove promptly when no longer indicated.  Recent Flowsheet Documentation  Taken 8/17/2021 1632 by Stephany Polanco RN  Infection Prevention:  • rest/sleep promoted  • environmental surveillance performed  Taken 8/17/2021 1410 by Stephany Polanco RN  Infection Prevention:  • rest/sleep promoted  • environmental surveillance performed  Taken 8/17/2021 1250 by Stephany Polanco RN  Infection Prevention:  • rest/sleep promoted  • environmental surveillance performed     Problem: Adult Inpatient Plan of Care  Goal: Optimal Comfort and Wellbeing  Outcome: Ongoing, Progressing  Intervention: Provide Person-Centered Care  Description: Use a family-focused approach to care.  Develop trust and rapport by proactively providing information, encouraging questions, addressing concerns and offering reassurance.  Acknowledge emotional response to hospitalization.  Recognize and utilize personal coping strategies.  Honor spiritual and cultural preferences.  Recent Flowsheet Documentation  Taken 8/17/2021 1250 by Stephany Polanco RN  Trust Relationship/Rapport:  • care explained  • choices provided  • empathic listening provided  • questions answered  • thoughts/feelings  acknowledged     Problem: Adult Inpatient Plan of Care  Goal: Readiness for Transition of Care  Outcome: Ongoing, Progressing  Intervention: Mutually Develop Transition Plan  Description: Identify available resources for support (e.g., family, friends, community).  Identify and address barriers to ongoing treatment and home management (e.g., environmental, financial).  Provide opportunities to practice self-management skills.  Assess and monitor emotional readiness for transition.  Establish/reconnect linkage with outpatient providers or community-based services.  Recent Flowsheet Documentation  Taken 8/17/2021 1341 by Stephany Polanco, RN  Transportation Anticipated: family or friend will provide  Patient/Family Anticipated Services at Transition:   Patient/Family Anticipates Transition to: home with family  Taken 8/17/2021 1337 by Stephany Polanco, RN  Equipment Currently Used at Home: none

## 2021-08-17 NOTE — NURSING NOTE
"Patient seen in pre-op for stoma marking    Patient placed in sitting position with abdomen exposed.  Stoma marked at the RUQ and RLQ.     Folds, creases/scars & underwear line avoided. Rectus muscle identified.    Patient able to visualize stoma marking(s) and point to \"X\" with their finger.    WOCN will continue to follow daily if an ostomy is required.   "

## 2021-08-17 NOTE — ANESTHESIA PROCEDURE NOTES
Airway  Urgency: elective    Date/Time: 8/17/2021 8:11 AM  Airway not difficult    General Information and Staff    Patient location during procedure: OR  Anesthesiologist: Sulema Woods DO    Indications and Patient Condition  Indications for airway management: airway protection    Preoxygenated: yes  MILS not maintained throughout  Mask difficulty assessment: 2 - vent by mask + OA or adjuvant +/- NMBA    Final Airway Details  Final airway type: endotracheal airway      Successful airway: ETT  Cuffed: yes   Successful intubation technique: direct laryngoscopy  Blade: Johnny  Blade size: 3  ETT size (mm): 8.0  Cormack-Lehane Classification: grade IIa - partial view of glottis  Placement verified by: chest auscultation and capnometry   Measured from: lips  ETT/EBT  to lips (cm): 22  Number of attempts at approach: 1  Assessment: lips, teeth, and gum same as pre-op and atraumatic intubation    Additional Comments  Negative epigastric sounds, Breath sound equal bilaterally with symmetric chest rise and fall.

## 2021-08-17 NOTE — H&P
"[employer]   Physician      H&P      Signed   Date of Service:  08/02/21 0000   Creation Time:  08/16/21 1058            Associated Documents  Scan on 8/2/2021 by [employer]: KIYA PITTMAN, 08/02/2021            Last signed by: [employer] at 08/16/21 1058     [facility] Pre-op    Full history and physical note from office is up to date.     /82 (BP Location: Right arm, Patient Position: Lying)   Pulse 107   Temp 98 °F (36.7 °C) (Temporal)   Resp 18   Ht 182.9 cm (72\")   Wt 116 kg (256 lb)   SpO2 95%   BMI 34.72 kg/m²   Patient denies allergy to contrast dye or latex  IMM:  Influenza: No  Pneumococcal: No  Tetanus: Up-to-date  Lungs: Increased AP diameter noted, scattered rhonchi throughout.  No wheezing.  Cardiovascular: S1-S2 without rubs murmurs or gallops  Abdomen: Soft, nontender, bowel sounds present throughout.    LAB Results:  Lab Results   Component Value Date    WBC 14.83 (H) 08/16/2021    HGB 17.0 08/16/2021    HCT 51.3 (H) 08/16/2021    MCV 89.4 08/16/2021     08/16/2021    GLUCOSE 164 (H) 08/16/2021    BUN 8 08/16/2021    CREATININE 0.76 08/16/2021    EGFRIFNONA 107 08/16/2021     08/16/2021    K 3.7 08/16/2021     08/16/2021    CO2 23.0 08/16/2021    CALCIUM 9.5 08/16/2021    ALBUMIN 4.00 08/16/2021    AST 20 08/16/2021    ALT 10 08/16/2021    BILITOT 0.4 08/16/2021       Cancer Staging (if applicable)  Cancer Patient: __ yes __no __unknown__N/A; If yes, clinical stage T:__ N:__M:__, stage group or __N/A  Impression: Colovesical fistula  Leukocytosis  Obesity  Recent weight loss  Tobacco abuse, ongoing  Probable COPD  Gout  Non-insulin-requiring diabetes mellitus  Plan: Laparoscopic/likely open low anterior resection, resection of fistula, possible stoma, bilateral urologic catheters.    LEONCIO Zaman 8/17/2021 06:50 EDT  "

## 2021-08-17 NOTE — CONSULTS
INFECTIOUS DISEASE CONSULT/INITIAL HOSPITAL VISIT    Lawrence Lieberman  1965  [id number]    Date of Consult: 8/17/2021    Admission Date: 8/17/2021      Requesting Provider: Brayan Ley MD  Evaluating Physician: Vic Brar MD    Reason for Consultation: Colovesicular fistula    History of present illness:    Patient is a 56 y.o. male with 3-month history of colovesicular fistula which began after memorial day patient has history of diverticulitis has been on antibiotics and is trying to cut back on tobacco use is gone down from using 2 packs/day down to 1 pack/day.  Patient taken for elective surgery patient was given dose of IV ertapenem prior to surgery is now on Zosyn patient has had intervention with a cystoscopy and bilateral stent placement today by urology followed by laparoscopic low anterior resection of colovesicular fistula with mobilization of splenic flexure and resection and culture of a pelvic abscess.  Patient had loop ileostomy and appendectomy today.    Patient is waking up from anesthesia reports the sensation need to urinate but has indwelling South catheter.  Patient is awake and alert family is by bedside patient denies fevers or chills.    Past Medical History:   Diagnosis Date   • Diabetes mellitus (CMS/HCC)    • Diverticulitis    • GERD (gastroesophageal reflux disease)    • Hearing loss    • History of kidney stones    • Hypertension    • Kidney stone        Past Surgical History:   Procedure Laterality Date   • COLONOSCOPY     • DENTAL PROCEDURE     • EYE SURGERY Right     eye socket surgery   • KIDNEY STONE SURGERY     • KNEE ARTHROSCOPY Bilateral        History reviewed. No pertinent family history.    Social History     Socioeconomic History   • Marital status: Single     Spouse name: Not on file   • Number of children: Not on file   • Years of education: Not on file   • Highest education level: Not on file   Tobacco Use   • Smoking status: Current Some Day Smoker      Packs/day: 1.00     Types: Cigarettes     Start date: 1984   • Smokeless tobacco: Never Used   • Tobacco comment: wearing a nicotine patch   Vaping Use   • Vaping Use: Never used   Substance and Sexual Activity   • Alcohol use: Never   • Drug use: Never   • Sexual activity: Defer       No Known Allergies      Medication:    Current Facility-Administered Medications:   •  acetaminophen (TYLENOL) tablet 650 mg, 650 mg, Oral, Q8H, Brayan Ley MD  •  [START ON 8/18/2021] allopurinol (ZYLOPRIM) tablet 100 mg, 100 mg, Oral, Daily, Gabi Feliz APRN  •  [START ON 8/18/2021] amLODIPine (NORVASC) tablet 5 mg, 5 mg, Oral, Daily, Gabi Feliz APRN  •  dextrose (D50W) 25 g/ 50mL Intravenous Solution 25 g, 25 g, Intravenous, Q15 Min PRN, Gabi Feliz APRN  •  dextrose (GLUTOSE) oral gel 15 g, 15 g, Oral, Q15 Min PRN, Gabi Feliz APRN  •  diazePAM (VALIUM) tablet 5 mg, 5 mg, Oral, Q6H PRN, Brayan Ley MD, 5 mg at 08/17/21 1305  •  famotidine (PEPCID) tablet 20 mg, 20 mg, Oral, BID, Brayan Ley MD  •  glucagon (human recombinant) (GLUCAGEN DIAGNOSTIC) injection 1 mg, 1 mg, Subcutaneous, Q15 Min PRN, Gabi Feliz APRN  •  [START ON 8/18/2021] heparin (porcine) 5000 UNIT/ML injection 5,000 Units, 5,000 Units, Subcutaneous, Q8H, Brayan Ley MD  •  HYDROcodone-acetaminophen (NORCO) 7.5-325 MG per tablet 1 tablet, 1 tablet, Oral, Q4H PRN, Brayan Ley MD, 1 tablet at 08/17/21 1305  •  HYDROmorphone (DILAUDID) injection 0.5 mg, 0.5 mg, Intravenous, Q2H PRN, 0.5 mg at 08/17/21 1410 **AND** naloxone (NARCAN) injection 0.4 mg, 0.4 mg, Intravenous, Q5 Min PRN, Brayan Ley MD  •  ibuprofen (ADVIL,MOTRIN) tablet 400 mg, 400 mg, Oral, Q6H PRN, Brayan Ley MD  •  insulin detemir (LEVEMIR) injection 10 Units, 10 Units, Subcutaneous, Nightly, Gabi Feliz, APRN  •  insulin lispro (humaLOG) injection 0-7 Units, 0-7 Units, Subcutaneous, TID AC, Gabi Feliz  MAGDA Britt  •  ketorolac (TORADOL) injection 15 mg, 15 mg, Intravenous, Q6H, Brayan Ley MD, 15 mg at 08/17/21 1305  •  labetalol (NORMODYNE,TRANDATE) injection 10 mg, 10 mg, Intravenous, Q4H PRN, Gabi Feliz APRN  •  lactated ringers infusion, 9 mL/hr, Intravenous, Continuous, Sulema Woods DO, Last Rate: 9 mL/hr at 08/17/21 0651, New Bag at 08/17/21 1047  •  [START ON 8/18/2021] nicotine (NICODERM CQ) 14 MG/24HR patch 1 patch, 1 patch, Transdermal, Daily, Gabi Feliz APRN  •  ondansetron (ZOFRAN) tablet 4 mg, 4 mg, Oral, Q6H PRN **OR** ondansetron (ZOFRAN) injection 4 mg, 4 mg, Intravenous, Q6H PRN, Brayan Ley MD, 4 mg at 08/17/21 1409  •  piperacillin-tazobactam (ZOSYN) 3.375 g in iso-osmotic dextrose 50 ml (premix), 3.375 g, Intravenous, Q8H, Brayan Ley MD  •  scopolamine patch 1 mg/72 hr, 1 patch, Transdermal, Continuous, Brayan Ley MD, 1 patch at 08/17/21 0659  •  sodium chloride 0.9 % bolus 500 mL, 500 mL, Intravenous, TID PRN, Gabi Feliz APRN  •  sodium chloride 0.9 % with KCl 20 mEq/L infusion, 100 mL/hr, Intravenous, Continuous, Brayan Ley MD, Last Rate: 100 mL/hr at 08/17/21 1321, 100 mL/hr at 08/17/21 1321    Antibiotics:  Anti-Infectives (From admission, onward)    Ordered     Dose/Rate Route Frequency Start Stop    08/17/21 1159  piperacillin-tazobactam (ZOSYN) 3.375 g in iso-osmotic dextrose 50 ml (premix)     Ordering Provider: Brayan Ley MD    3.375 g  over 4 Hours Intravenous Every 8 Hours 08/17/21 1800 08/22/21 1759    08/17/21 0632  ertapenem (INVanz) 1 g/100 mL 0.9% NS VTB (mbp)     Ordering Provider: Brayan Ley MD    1 g  over 30 Minutes Intravenous Once 08/17/21 0634 08/17/21 0808            Review of Systems:    Patient reports abdominal pain also reports urinary urgency although he has a South catheter in place denies fevers or chills    Rest of review of systems were reviewed and were unremarkable    Physical  Exam:   Vital Signs  Temp (24hrs), Av.6 °F (36.4 °C), Min:97 °F (36.1 °C), Max:98 °F (36.7 °C)    Temp  Min: 97 °F (36.1 °C)  Max: 98 °F (36.7 °C)  BP  Min: 109/79  Max: 147/99  Pulse  Min: 89  Max: 107  Resp  Min: 14  Max: 24  SpO2  Min: 91 %  Max: 95 %    GENERAL: Awake and alert, in no acute distress.  Age-appropriate  HEENT: Normocephalic, atraumatic.  PERRL. EOMI. No conjunctival injection. No icterus.  No external oral lesions    HEART: RRR; No murmur,   LUNGS: Clear to auscultation bilaterally   ABDOMEN: Soft, nontender, nondistended.  Loop ileostomy bag present midline abdominal wound covered clean and dry and intact  EXT:  No cyanosis, clubbing or edema. No cord.  :  Without South catheter.  MSK: No joint deformity  SKIN: Warm and dry without cutaneous eruptions on Inspection/palpation.    NEURO: Oriented to PPT.  PSYCHIATRIC: Normal insight and judgement. Cooperative with PE    Laboratory Data    Results from last 7 days   Lab Units 21  1046   WBC 10*3/mm3 14.83*   HEMOGLOBIN g/dL 17.0   HEMATOCRIT % 51.3*   PLATELETS 10*3/mm3 252     Results from last 7 days   Lab Units 21  1046   SODIUM mmol/L 136   POTASSIUM mmol/L 3.7   CHLORIDE mmol/L 101   CO2 mmol/L 23.0   BUN mg/dL 8   CREATININE mg/dL 0.76   GLUCOSE mg/dL 164*   CALCIUM mg/dL 9.5     Results from last 7 days   Lab Units 21  1046   ALK PHOS U/L 91   BILIRUBIN mg/dL 0.4   ALT (SGPT) U/L 10   AST (SGOT) U/L 20                         Estimated Creatinine Clearance: 146.2 mL/min (by C-G formula based on SCr of 0.76 mg/dL).      Microbiology:  No results found for: ACANTHNAEG, AFBCX, BPERTUSSISCX, BLOODCX  No results found for: BCIDPCR, CXREFLEX, CSFCX, CULTURETIS  No results found for: CULTURES, HSVCX, URCX  No results found for: EYECULTURE, GCCX, HSVCULTURE, LABHSV  No results found for: LEGIONELLA, MRSACX, MUMPSCX, MYCOPLASCX  No results found for: NOCARDIACX, STOOLCX  No results found for: THROATCX, UNSTIMCULT, URINECX,  CULTURE, VZVCULTUR  No results found for: VIRALCULTU, WOUNDCX        Radiology:  Imaging Results (Last 72 Hours)     ** No results found for the last 72 hours. **            Impression:   Pelvic abscess  Colovesicular fistula  Status post laparoscopic low anterior resection of colovesicular fistula  Loop ileostomy  Tobacco abuse  Weight loss  Leukocytosis with neutrophilia  Status post cystectomy and bilateral stent placement  PLAN/RECOMMENDATIONS:   Thank you for asking us to see Lawrence Lieberman, I recommend the following:  Continue piperacillin/tazobactam cover for gram-negative rods and anaerobic process    Start micafungin 100 mg IV now on daily    Order PICC line for tomorrow    Anticipate 2 to 3 weeks of IV antibiotics    Case discussed with patient's family and you show that the loop ileostomy is working properly and patient has toleration of oral intake before consideration of discharge       Vic Brar MD  8/17/2021  15:54 EDT

## 2021-08-17 NOTE — OP NOTE
[facility] ([facility])    LAPAROSCOPIC LOW ANTERIOR, anastomosis at 5 cm/below the peritoneal reflection and inflammation  RESECTION OF COLOVESICAL FISTULA  MOBILIZATION OF SPLENIC FLEXURE  RESECTION AND CULTURE OF PELVIC ABSCESS  APPENDECTOMY  LOOP ILEOSTOMY    Procedure Note    Lawrence Christine Lieberman  8/17/2021    Pre-op Diagnosis: History of fistula, urosepsis, tobacco use, diverticulitis, antibiotic therapy, cystoscopy, BMI 35, 30 pound unintentional weight loss, 3 months of symptoms, air-fluid level in the right ureter.  Pelvic pain and fecal urea.      Post-op Diagnosis:   Findings consistent with pelvic abscess and colovesical fistula, resected, cultures obtained.    Anesthesia: General with Block    Staff:   Circulator: Stephany Villagran RN  Scrub Person: Yanick Hurst Amanda J  Assistant: Maricel Kang RNFA    Assistant: Maricel Kang RNFA was responsible for performing the following activities: Exposure, irrigation, camera work, approximation of fascia at trocar site and their skilled assistance was necessary for the success of this case.    Estimated Blood Loss: 200 cc    Specimens: Rectosigmoid, abscess, fistula, appendix, anastomotic tissue        Drains: Pelvic DOMINIC drain    Findings: As above    Complications: None evident the procedure was reviewed    The procedure was reviewed in the preoperative area, there have been no interval changes in the health by history or physical exam, the patient was counseled by the stomal therapist preoperatively.    With antibiotic and DVT prophylaxis, we advanced to the operating room where general anesthesia was achieved, block was performed, cystoscopy and urologic catheters was performed by urology.    The abdomen and perineum were prepped and draped, timeout protocol was utilized.    GelPort was placed to the umbilicus    Laparoscopy demonstrated dense inflammatory change and findings consistent with pelvic  abscess and fistula from the sigmoid, involving the omentum, and the dome of the bladder.    There was fat wrapping of the small bowel with this appeared to be associated with visceral lipomatous change, it did not appear like Crohn's disease.    There is fat wrapping of the left colon and flexure.    The left colon was mobilized away from the kidney, the proximal sigmoid was mobilized, the ureter was identified in the retroperitoneal reflection, the fistula and abscess cavity was circumferentially cleared in the deep pelvis.    The left colon was further mobilized, the omentum was dissected away from the pelvis.    The gastrocolic ligament was taken down, the distal transverse colon was mobilized, the proximal left colon was mobilized, the flexure was completely mobilized away from the spleen and pancreas up to the duodenum.    After mobilization of the flexure, we transition to steep Trendelenburg.    Appendectomy was performed given the juxtaposition of the appendix to the inflammatory process in the history of pelvic pain, this was achieved with LigaSure and Endo RIVAS 45, grain load.    The hostile pelvis was then dissected, the retroperitoneal reflection and areolar change between the retroperitoneum and mesocolon/mesial rectum was identified, this was taken down in the presacral plane posterior to the inflammation, medial to lateral dissection was performed with circumferential clearing of the inferior mesenteric artery was was divided with Endo RIVAS 45 vascular load.    Similarly the inferior mesenteric vein was divided with Endo RIVAS 45 vascular load at the pancreas and lateral to the duodenum.    This achieved good reach of the left colon down to the deep pelvis.    The dissection then continued medial to the bilateral ureteral catheter/ureters down into the deep pelvis circumferentially clearing the rectum, the tissue at the rectosigmoid was fibrotic and inflamed, beneath the peritoneal reflection the rectum  appeared more suitable for anastomosis.  The rectum was cleared distally, which ultimately measured 5 to 6 cm above the dentate line at the time of proctoscopy following anastomosis.    The rectum had large caliber, it was cleared.  The rectum was divided with sequential Endo RIVAS 45 green loads.    The fistula was taken down dividing the tissue and tunneled between the sigmoid and the bladder.  The bladder was distended with dilute methylene blue and was fluid tight without evidence of persistent defect.    Cultures were obtained from the pelvic abscess/fistula.    The specimen was delivered through the umbilical GelPort, Doppler auscultation demonstrated brisk arterial inflow into the left colon.  28 EEA was placed intraluminally, there was good reach of the left colon down to the pelvis.  Of note was utilization of the left midabdomen quadrant 5 mm trocar and right lower quadrant 12 mm trocar.    The anastomosis was performed.  The appendectomy site was inspected and appeared satisfactory.  There is no twist or torsion of the left colon.  Irrigation aspiration was performed.  10 flat DOMINIC was placed in the left lower quadrant 5 mm trocar site.  20 cm upstream of the ileocolic junction and site was identified for protecting loop ileostomy, there was fat wrapping of the small bowel, this was delivered across the rectus at the preoperatively identified location.    The midline GelPort fascia was closed with internal retention of 0 Vicryl no 1 loop PDS, the wound was irrigated with dilute antiseptic solution noting the dirty nature of the procedure.    The skin was reapproximated skin clips.    The stoma was then matured with Aye eversion proximally and flat maturation distally, the caliber of the small bowel was small with a lot of surrounding fatty mesenteric tissue, a red rubber catheter was placed in the brooked loop ileostomy to ensure patency.    The final counts were announced as correct, the procedure  appeared well tolerated.    Brayan Ley MD     Date: 8/17/2021  Time: 11:47 EDT

## 2021-08-17 NOTE — ANESTHESIA POSTPROCEDURE EVALUATION
Patient: Lawrence Lieberman    Procedure Summary     Date: 08/17/21 Room / Location:  [facility] /  [facility]    Anesthesia Start: 0807 Anesthesia Stop: 1123    Procedure: LAPAROSCOPIC ASSISTED  LOW ANTERIOR RESECTION, MOBILIZATION OF SPLENIC FLEXURE, RESECTION OF COLOVESICAL FISTULA, APPENDECTOMY, LOOP IILEOSTOMY, PLACEMENT OF BILATERAL UROLOGIC CATHETERS (N/A Abdomen) Diagnosis:     Surgeons: Brayan Ley MD Provider: Sulema Woods DO    Anesthesia Type: general ASA Status: 3          Anesthesia Type: general    Vitals  No vitals data found for the desired time range.          Post Anesthesia Care and Evaluation    Patient location during evaluation: PACU  Patient participation: complete - patient participated  Level of consciousness: awake and alert  Pain management: adequate  Airway patency: patent  Anesthetic complications: No anesthetic complications  PONV Status: none  Cardiovascular status: hemodynamically stable and acceptable  Respiratory status: nonlabored ventilation, acceptable and nasal cannula  Hydration status: acceptable

## 2021-08-17 NOTE — ANESTHESIA PREPROCEDURE EVALUATION
Anesthesia Evaluation     Patient summary reviewed   no history of anesthetic complications:  NPO Solid Status: > 8 hours  NPO Liquid Status: > 2 hours           Airway   Mallampati: I  TM distance: >3 FB  Neck ROM: full  No difficulty expected  Dental - normal exam     Pulmonary - normal exam   (+) a smoker Current,   (-) asthma, sleep apnea  Cardiovascular - normal exam  Exercise tolerance: good (4-7 METS)    ECG reviewed    (+) hypertension,   (-) dysrhythmias, angina, CHF, cardiac stents, DVT    ROS comment: ekg     Neuro/Psych  (-) seizures, TIA, CVA  GI/Hepatic/Renal/Endo    (+)  GERD,  diabetes mellitus type 2,   (-) no thyroid disorder    Musculoskeletal     Abdominal    Substance History - negative use     OB/GYN          Other        ROS/Med Hx Other: hgb 17 k 3.7  Denies blood thinners                Anesthesia Plan    ASA 3     general   (Risks and benefits of general anesthesia discussed with patient (including MI, CVA, death, recall, aspiration), questions answered, agreeable to proceed.    Discussed and consented pt for TAPs if surgeon requests.    Versed for preop anxiolysis per pt request.)  intravenous induction     Anesthetic plan, all risks, benefits, and alternatives have been provided, discussed and informed consent has been obtained with: patient.  Use of blood products discussed with patient  Consented to blood products.   Plan discussed with CRNA.

## 2021-08-17 NOTE — H&P
Admission HP     Patient Name: Lawrence Lieberman  MRN: [id number]  : 1965  DOS: 2021    Attending: Brayan Ley MD    Primary Care Provider: Chano Valverde MD      Patient Care Team:  Chano Valverde MD as PCP - General (Family Medicine)    Chief complaint: Abdominal pain    Subjective   Patient is a pleasant 56 y.o. male presented for scheduled surgery by Dr. Ley with assistance from Dr. Rosaline Keys.  He underwent LAPAROSCOPIC LOW ANTERIOR, anastomosis at 5 cm/below the peritoneal reflection and inflammation RESECTION OF COLOVESICAL FISTULA MOBILIZATION OF SPLENIC FLEXURE RESECTION AND CULTURE OF PELVIC ABSCESS APPENDECTOMY LOOP ILEOSTOMY by Dr. Ley and placement of bilateral urologic catheters by Dr. Rosaline Keys.  Surgery went well and he was admitted for further medical management.    When seen in PACU, patient is having significant incisional pain, 10 out of 10 and appears very anxious.  Nurse reports he was very anxious thrashing around in the bed when he came to them from the OR.  He has been given IV pain medicine with little relief.  IV Valium ordered for anxiety.  He is unable to give a thorough history at this time.    (Above is noted, agree.  Seen in his room afterwards, still complaining of pain though he is quite drowsy and family noted intermittently his oxygen saturation dropping some, he is requiring 4 L of oxygen via nasal cannula currently.)wy    Allergies:  No Known Allergies       Medications Prior to Admission   Medication Sig Dispense Refill Last Dose   • allopurinol (ZYLOPRIM) 100 MG tablet Take 100 mg by mouth Daily.   2021 at 0400   • amLODIPine (NORVASC) 5 MG tablet Take 5 mg by mouth Daily.   2021 at 0400   • empagliflozin (Jardiance) 10 MG tablet tablet Take 10 mg by mouth Daily.   2021 at 0700   • levoFLOXacin (LEVAQUIN) 750 MG tablet Take 750 mg by mouth Daily.   2021 at 0400   • metroNIDAZOLE (FLAGYL) 500 MG tablet Take  "500 mg by mouth 3 (Three) Times a Day.   8/17/2021 at 0400   • nicotine (NICODERM CQ) 14 MG/24HR patch Place 1 patch on the skin as directed by provider Daily.   8/17/2021 at 0400   • SITagliptin (JANUVIA) 100 MG tablet Take 100 mg by mouth Daily.   8/16/2021 at 0700       Past Medical History:   Diagnosis Date   • Diabetes mellitus (CMS/HCC)    • Diverticulitis    • GERD (gastroesophageal reflux disease)    • Hearing loss    • History of kidney stones    • Hypertension    • Kidney stone      Past Surgical History:   Procedure Laterality Date   • COLONOSCOPY     • DENTAL PROCEDURE     • EYE SURGERY Right     eye socket surgery   • KIDNEY STONE SURGERY     • KNEE ARTHROSCOPY Bilateral      History reviewed. No pertinent family history.  Social History     Tobacco Use   • Smoking status: Current Some Day Smoker     Packs/day: 1.00     Types: Cigarettes     Start date: 1984   • Smokeless tobacco: Never Used   • Tobacco comment: wearing a nicotine patch   Vaping Use   • Vaping Use: Never used   Substance Use Topics   • Alcohol use: Never   • Drug use: Never       Review of Systems  Pertinent items are noted in HPI, all other systems reviewed and negative    Vital Signs  /75 (BP Location: Right arm, Patient Position: Lying)   Pulse 102   Temp 97.6 °F (36.4 °C) (Temporal)   Resp 20   Ht 182.9 cm (72\")   Wt 116 kg (256 lb)   SpO2 93%   BMI 34.72 kg/m²     Physical Exam:    General Appearance:    Alert, cooperative, in no acute distress   Head:    Normocephalic, without obvious abnormality, atraumatic   Eyes:            Lids and lashes normal, conjunctivae and sclerae normal, no   icterus, no pallor, corneas clear   Ears:    Ears appear intact with no abnormalities noted   Throat:   No oral lesions, no thrush, oral mucosa moist   Neck:   No adenopathy, supple, trachea midline, no thyromegaly         Lungs:     Clear to auscultation,respirations regular, even and       unlabored. No wheezes or rales.    Heart:   "  Regular rhythm and normal rate, normal S1 and S2, no murmur    Abdomen:    Surgical incision dressing CDI.  Ostomy in RLQ with red rubber catheter in place, and ostomy pouch over stoma.  Stoma beefy red, small sanguinous drainage.  DOMINIC drain intact LUQ   Genitalia:    Deferred  South catheter in place   Extremities:   Moves all extremities well, no edema, no cyanosis, no              redness   Pulses:   Pulses palpable and equal bilaterally   Skin:   No bleeding, bruising or rash   Neurologic:   Cranial nerves 2 - 12 grossly intact     Results from last 7 days   Lab Units 08/16/21  1046   WBC 10*3/mm3 14.83*   HEMOGLOBIN g/dL 17.0   HEMATOCRIT % 51.3*   PLATELETS 10*3/mm3 252     Results from last 7 days   Lab Units 08/16/21  1046   SODIUM mmol/L 136   POTASSIUM mmol/L 3.7   CHLORIDE mmol/L 101   CO2 mmol/L 23.0   BUN mg/dL 8   CREATININE mg/dL 0.76   CALCIUM mg/dL 9.5   BILIRUBIN mg/dL 0.4   ALK PHOS U/L 91   ALT (SGPT) U/L 10   AST (SGOT) U/L 20   GLUCOSE mg/dL 164*     Lab Results   Component Value Date    HGBA1C 8.00 (H) 08/16/2021       Assessment and Plan:       S/P LAPAROSCOPIC LOW ANTERIOR, RESECTION OF COLOVESICAL FISTULA MOBILIZATION OF SPLENIC FLEXURE RESECTION    DM2 (diabetes mellitus, type 2) (CMS/HCC)    Diverticulitis    Hypertension    Colovesical fistula  JERRY history    Plan  1. Ambulation, encouraged, several times daily.wy  2. Pain control-prns   3. IS-encourage  4. DVT proph- Mechanical, subcutaneous heparin  5. Bowel regimen  6. Resume home medications as appropriate  7. Monitor post-op labs  8. DC planning   9. Diet, Clears, advance diet as tolerated.  IVF initially, monitor volume status.    Post op Antibiotics per Infectious Disease    DM2  - hgb A1c on 8/16/2021 8.0  -Levemir 10 units nightly  - Accu-Chek AC and HS with low dose SSI    HTN, Hyperlipidemia  - Continue home Norvasc  - Monitor BP   - Holding parameters for BP meds  - Labetalol PRN for SBP>170    CPAP at at bedtime and as  [email or web address] Dowd disclaimer:  Part of this encounter note is an electronic transcription/translation of spoken language to printed text. The electronic translation of spoken language may permit erroneous, or at times, nonsensical words or phrases to be inadvertently transcribed; Although I have reviewed the note for such errors, some may still exist.    MAGDA Garcia  08/17/21  12:48 EDT    I have personally performed the evaluation on this patient. My history is consistent  with HPI obtained. My exam findings are listed above. I have personally reviewed and discussed the above formulated treatment plan with patient and family and with Gabi MAN.wy

## 2021-08-18 PROBLEM — G89.18 ACUTE POSTOPERATIVE PAIN: Status: ACTIVE | Noted: 2021-08-18

## 2021-08-18 PROBLEM — D72.829 LEUKOCYTOSIS: Status: ACTIVE | Noted: 2021-08-18

## 2021-08-18 LAB
ANION GAP SERPL CALCULATED.3IONS-SCNC: 10 MMOL/L (ref 5–15)
BASOPHILS # BLD AUTO: 0.03 10*3/MM3 (ref 0–0.2)
BASOPHILS NFR BLD AUTO: 0.2 % (ref 0–1.5)
BUN SERPL-MCNC: 10 MG/DL (ref 6–20)
BUN/CREAT SERPL: 14.5 (ref 7–25)
CALCIUM SPEC-SCNC: 9.2 MG/DL (ref 8.6–10.5)
CHLORIDE SERPL-SCNC: 104 MMOL/L (ref 98–107)
CO2 SERPL-SCNC: 25 MMOL/L (ref 22–29)
CREAT SERPL-MCNC: 0.69 MG/DL (ref 0.76–1.27)
CYTO UR: NORMAL
DEPRECATED RDW RBC AUTO: 48 FL (ref 37–54)
EOSINOPHIL # BLD AUTO: 0.02 10*3/MM3 (ref 0–0.4)
EOSINOPHIL NFR BLD AUTO: 0.1 % (ref 0.3–6.2)
ERYTHROCYTE [DISTWIDTH] IN BLOOD BY AUTOMATED COUNT: 13.7 % (ref 12.3–15.4)
GFR SERPL CREATININE-BSD FRML MDRD: 119 ML/MIN/1.73
GLUCOSE BLDC GLUCOMTR-MCNC: 142 MG/DL (ref 70–130)
GLUCOSE BLDC GLUCOMTR-MCNC: 143 MG/DL (ref 70–130)
GLUCOSE BLDC GLUCOMTR-MCNC: 159 MG/DL (ref 70–130)
GLUCOSE BLDC GLUCOMTR-MCNC: 173 MG/DL (ref 70–130)
GLUCOSE SERPL-MCNC: 172 MG/DL (ref 65–99)
HCT VFR BLD AUTO: 44.7 % (ref 37.5–51)
HGB BLD-MCNC: 14.1 G/DL (ref 13–17.7)
IMM GRANULOCYTES # BLD AUTO: 0.11 10*3/MM3 (ref 0–0.05)
IMM GRANULOCYTES NFR BLD AUTO: 0.7 % (ref 0–0.5)
LAB AP CASE REPORT: NORMAL
LAB AP CLINICAL INFORMATION: NORMAL
LAB AP DIAGNOSIS COMMENT: NORMAL
LYMPHOCYTES # BLD AUTO: 1.78 10*3/MM3 (ref 0.7–3.1)
LYMPHOCYTES NFR BLD AUTO: 11.5 % (ref 19.6–45.3)
MCH RBC QN AUTO: 29.6 PG (ref 26.6–33)
MCHC RBC AUTO-ENTMCNC: 31.5 G/DL (ref 31.5–35.7)
MCV RBC AUTO: 93.7 FL (ref 79–97)
MONOCYTES # BLD AUTO: 1.36 10*3/MM3 (ref 0.1–0.9)
MONOCYTES NFR BLD AUTO: 8.8 % (ref 5–12)
NEUTROPHILS NFR BLD AUTO: 12.23 10*3/MM3 (ref 1.7–7)
NEUTROPHILS NFR BLD AUTO: 78.7 % (ref 42.7–76)
NRBC BLD AUTO-RTO: 0 /100 WBC (ref 0–0.2)
PATH REPORT.FINAL DX SPEC: NORMAL
PATH REPORT.GROSS SPEC: NORMAL
PLATELET # BLD AUTO: 211 10*3/MM3 (ref 140–450)
PMV BLD AUTO: 10.6 FL (ref 6–12)
POTASSIUM SERPL-SCNC: 4.7 MMOL/L (ref 3.5–5.2)
RBC # BLD AUTO: 4.77 10*6/MM3 (ref 4.14–5.8)
SODIUM SERPL-SCNC: 139 MMOL/L (ref 136–145)
WBC # BLD AUTO: 15.53 10*3/MM3 (ref 3.4–10.8)

## 2021-08-18 PROCEDURE — 25010000002 PIPERACILLIN SOD-TAZOBACTAM PER 1 G: Performed by: COLON & RECTAL SURGERY

## 2021-08-18 PROCEDURE — C1751 CATH, INF, PER/CENT/MIDLINE: HCPCS

## 2021-08-18 PROCEDURE — C1894 INTRO/SHEATH, NON-LASER: HCPCS

## 2021-08-18 PROCEDURE — 63710000001 INSULIN DETEMIR PER 5 UNITS

## 2021-08-18 PROCEDURE — 25010000002 KETOROLAC TROMETHAMINE PER 15 MG: Performed by: COLON & RECTAL SURGERY

## 2021-08-18 PROCEDURE — 85025 COMPLETE CBC W/AUTO DIFF WBC: CPT | Performed by: COLON & RECTAL SURGERY

## 2021-08-18 PROCEDURE — 25010000002 MICAFUNGIN SODIUM 100 MG RECONSTITUTED SOLUTION: Performed by: INTERNAL MEDICINE

## 2021-08-18 PROCEDURE — 80048 BASIC METABOLIC PNL TOTAL CA: CPT | Performed by: COLON & RECTAL SURGERY

## 2021-08-18 PROCEDURE — 82962 GLUCOSE BLOOD TEST: CPT

## 2021-08-18 PROCEDURE — 25010000002 SODIUM CHLORIDE 0.9 % WITH KCL 20 MEQ 20-0.9 MEQ/L-% SOLUTION: Performed by: COLON & RECTAL SURGERY

## 2021-08-18 PROCEDURE — 25010000002 HEPARIN (PORCINE) PER 1000 UNITS: Performed by: COLON & RECTAL SURGERY

## 2021-08-18 PROCEDURE — 97161 PT EVAL LOW COMPLEX 20 MIN: CPT | Performed by: PHYSICAL THERAPIST

## 2021-08-18 PROCEDURE — 63710000001 INSULIN LISPRO (HUMAN) PER 5 UNITS

## 2021-08-18 RX ORDER — SODIUM CHLORIDE 0.9 % (FLUSH) 0.9 %
10 SYRINGE (ML) INJECTION EVERY 12 HOURS SCHEDULED
Status: DISCONTINUED | OUTPATIENT
Start: 2021-08-18 | End: 2021-08-20 | Stop reason: HOSPADM

## 2021-08-18 RX ORDER — SODIUM CHLORIDE 0.9 % (FLUSH) 0.9 %
10 SYRINGE (ML) INJECTION AS NEEDED
Status: DISCONTINUED | OUTPATIENT
Start: 2021-08-18 | End: 2021-08-20 | Stop reason: HOSPADM

## 2021-08-18 RX ADMIN — HEPARIN SODIUM 5000 UNITS: 5000 INJECTION, SOLUTION INTRAVENOUS; SUBCUTANEOUS at 20:50

## 2021-08-18 RX ADMIN — POTASSIUM CHLORIDE AND SODIUM CHLORIDE 100 ML/HR: 900; 150 INJECTION, SOLUTION INTRAVENOUS at 20:48

## 2021-08-18 RX ADMIN — SODIUM CHLORIDE 500 ML: 4.5 INJECTION, SOLUTION INTRAVENOUS at 17:33

## 2021-08-18 RX ADMIN — KETOROLAC TROMETHAMINE 15 MG: 15 INJECTION, SOLUTION INTRAMUSCULAR; INTRAVENOUS at 01:17

## 2021-08-18 RX ADMIN — FAMOTIDINE 20 MG: 20 TABLET ORAL at 20:50

## 2021-08-18 RX ADMIN — NICOTINE 1 PATCH: 14 PATCH, EXTENDED RELEASE TRANSDERMAL at 09:18

## 2021-08-18 RX ADMIN — KETOROLAC TROMETHAMINE 15 MG: 15 INJECTION, SOLUTION INTRAMUSCULAR; INTRAVENOUS at 09:18

## 2021-08-18 RX ADMIN — ALLOPURINOL 100 MG: 100 TABLET ORAL at 09:19

## 2021-08-18 RX ADMIN — HEPARIN SODIUM 5000 UNITS: 5000 INJECTION, SOLUTION INTRAVENOUS; SUBCUTANEOUS at 15:16

## 2021-08-18 RX ADMIN — INSULIN LISPRO 2 UNITS: 100 INJECTION, SOLUTION INTRAVENOUS; SUBCUTANEOUS at 09:19

## 2021-08-18 RX ADMIN — ACETAMINOPHEN 650 MG: 325 TABLET, FILM COATED ORAL at 01:17

## 2021-08-18 RX ADMIN — HYDROCODONE BITARTRATE AND ACETAMINOPHEN 1 TABLET: 7.5; 325 TABLET ORAL at 23:20

## 2021-08-18 RX ADMIN — MICAFUNGIN SODIUM 100 MG: 100 INJECTION, POWDER, LYOPHILIZED, FOR SOLUTION INTRAVENOUS at 16:00

## 2021-08-18 RX ADMIN — FAMOTIDINE 20 MG: 20 TABLET ORAL at 09:19

## 2021-08-18 RX ADMIN — TAZOBACTAM SODIUM AND PIPERACILLIN SODIUM 3.38 G: 375; 3 INJECTION, SOLUTION INTRAVENOUS at 01:17

## 2021-08-18 RX ADMIN — TAZOBACTAM SODIUM AND PIPERACILLIN SODIUM 3.38 G: 375; 3 INJECTION, SOLUTION INTRAVENOUS at 17:31

## 2021-08-18 RX ADMIN — ACETAMINOPHEN 650 MG: 325 TABLET, FILM COATED ORAL at 15:59

## 2021-08-18 RX ADMIN — ACETAMINOPHEN 650 MG: 325 TABLET, FILM COATED ORAL at 09:17

## 2021-08-18 RX ADMIN — INSULIN DETEMIR 10 UNITS: 100 INJECTION, SOLUTION SUBCUTANEOUS at 20:51

## 2021-08-18 RX ADMIN — TAZOBACTAM SODIUM AND PIPERACILLIN SODIUM 3.38 G: 375; 3 INJECTION, SOLUTION INTRAVENOUS at 09:18

## 2021-08-18 RX ADMIN — AMLODIPINE BESYLATE 5 MG: 5 TABLET ORAL at 09:19

## 2021-08-18 RX ADMIN — INSULIN LISPRO 2 UNITS: 100 INJECTION, SOLUTION INTRAVENOUS; SUBCUTANEOUS at 18:40

## 2021-08-18 RX ADMIN — KETOROLAC TROMETHAMINE 15 MG: 15 INJECTION, SOLUTION INTRAMUSCULAR; INTRAVENOUS at 20:51

## 2021-08-18 RX ADMIN — KETOROLAC TROMETHAMINE 15 MG: 15 INJECTION, SOLUTION INTRAMUSCULAR; INTRAVENOUS at 15:17

## 2021-08-18 RX ADMIN — HEPARIN SODIUM 5000 UNITS: 5000 INJECTION, SOLUTION INTRAVENOUS; SUBCUTANEOUS at 05:33

## 2021-08-18 RX ADMIN — HYDROCODONE BITARTRATE AND ACETAMINOPHEN 1 TABLET: 7.5; 325 TABLET ORAL at 09:18

## 2021-08-18 RX ADMIN — HEPARIN SODIUM 5000 UNITS: 5000 INJECTION, SOLUTION INTRAVENOUS; SUBCUTANEOUS at 22:00

## 2021-08-18 NOTE — PLAN OF CARE
Goal Outcome Evaluation:  Plan of Care Reviewed With: patient           Outcome Summary: PT evaluation completed.  Pt stood with MinAx1, ambulated 240 feet using rw with CGAx1, and transferred sit-->supine with MinAx1.  Skilled PT services warranted to improve mobility and safety.  Recommend home with assist at d/c.

## 2021-08-18 NOTE — PROGRESS NOTES
"IM progress note      Lawrence Lieberman  [id number]  1965     LOS: 1 day     Attending: Brayan Ley MD    Primary Care Provider: hCano Valverde MD      Chief Complaint/Reason for visit: Abdominal pain    Subjective   Doing fairly well.  Moderate abdominal pain.  Tolerating diet.  Ambulating in the halls.  No stoma output. Denies f/c/n/v/sob/cp.    Objective     Vital Signs    Visit Vitals  /75   Pulse 87   Temp 97.6 °F (36.4 °C) (Temporal)   Resp 18   Ht 182.9 cm (72\")   Wt 116 kg (256 lb)   SpO2 94%   BMI 34.72 kg/m²       Nutrition: ADAT    Respiratory: Room air    Physical Exam:     General Appearance:    Alert, cooperative, in no acute distress   Head:    Normocephalic, without obvious abnormality, atraumatic    Lungs:     Normal effort, symmetric chest rise, no crepitus, clear to      auscultation bilaterally             Heart:    Regular rhythm and normal rate, normal S1 and S2   Abdomen:     Surgical incision dressing CDI.  Ostomy in RLQ with red rubber catheter in place, and ostomy pouch over stoma.  Stoma beefy red, small sanguinous drainage.  DOMINIC drain intact LUQ   Extremities:   No clubbing, cyanosis or edema.  No deformities.    Pulses:   Pulses palpable and equal bilaterally   Skin:   No bleeding, bruising or rash   Neurologic:   Moves all extremities with no obvious focal motor deficit.  Cranial nerves 2 - 12 grossly intact     Results Review:     I reviewed the patient's new clinical results.   Results from last 7 days   Lab Units 08/18/21  0418 08/16/21  1046   WBC 10*3/mm3 15.53* 14.83*   HEMOGLOBIN g/dL 14.1 17.0   HEMATOCRIT % 44.7 51.3*   PLATELETS 10*3/mm3 211 252     Results from last 7 days   Lab Units 08/18/21  0418 08/16/21  1046   SODIUM mmol/L 139 136   POTASSIUM mmol/L 4.7 3.7   CHLORIDE mmol/L 104 101   CO2 mmol/L 25.0 23.0   BUN mg/dL 10 8   CREATININE mg/dL 0.69* 0.76   CALCIUM mg/dL 9.2 9.5   BILIRUBIN mg/dL  --  0.4   ALK PHOS U/L  --  91   ALT (SGPT) U/L  --  " 10   AST (SGOT) U/L  --  20   GLUCOSE mg/dL 172* 164*     Results for SOMMER CHOWDHURY CHA (MRN [id number]) as of 8/18/2021 15:24   Ref. Range 8/17/2021 20:27 8/18/2021 04:18 8/18/2021 07:57 8/18/2021 12:00   Glucose Latest Ref Range: 70 - 130 mg/dL 188 (H) 172 (H) 159 (H) 142 (H)     I reviewed the patient's new imaging including images and reports.    All medications reviewed.   acetaminophen, 650 mg, Oral, Q8H  allopurinol, 100 mg, Oral, Daily  amLODIPine, 5 mg, Oral, Daily  famotidine, 20 mg, Oral, BID  heparin (porcine), 5,000 Units, Subcutaneous, Q8H  insulin detemir, 10 Units, Subcutaneous, Nightly  insulin lispro, 0-7 Units, Subcutaneous, TID AC  ketorolac, 15 mg, Intravenous, Q6H  micafungin (MYCAMINE) IV, 100 mg, Intravenous, Q24H  nicotine, 1 patch, Transdermal, Daily  piperacillin-tazobactam, 3.375 g, Intravenous, Q8H  sodium chloride, 10 mL, Intravenous, Q12H        Assessment/Plan     S/P LAR, appy, loop ileostomy    Colovesical fistula    DM2 (diabetes mellitus, type 2) (CMS/HCC)    Diverticulitis    Hypertension    Acute postoperative pain    Leukocytosis      Plan  1. Ambulation  2. Pain control-prns   3. IS-encouraged  4. DVT proph- mechs/heparin  5. Bowel regimen  6. Diet/IVF per surgery  7. Monitor post-op labs  8. DC planning for home     Post op Antibiotics per Infectious Disease  -PICC placed 8/18  -Continue micafungin and Zosyn     DM2  - hgb A1c on 8/16/2021 8.0  -Levemir 10 units nightly  - Accu-Chek AC and HS with low dose SSI     HTN, Hyperlipidemia  - Continue home Norvasc  - Monitor BP   - Holding parameters for BP meds  - Labetalol PRN for SBP>170     CPAP at at bedtime and as needed.MAGDA Henley  08/18/21  15:24 EDT

## 2021-08-18 NOTE — PLAN OF CARE
Goal Outcome Evaluation:  VSS, room air, alert and oriented. Pt c/o pain with activity, PRNs administered, effective per pt. Denies nausea or vomiting. Pt ambulated 2x in the shin today. Incentive spirometer in use. Spouse at bedside. South output dark brown colored. 500 ml 1/2 NS Bolus completed per order. Flatus and serosanguinous drainage noted in Ostomy bag. Spouse allowed to stay through tonight by director Merissa Demarco. Will continue to monitor.   Problem: Adult Inpatient Plan of Care  Goal: Absence of Hospital-Acquired Illness or Injury  Intervention: Identify and Manage Fall Risk  Description: Perform standard risk assessment with a validated tool or comprehensive approach appropriate to the patient on admission; reassess fall risk frequently, with change in status or transfer to another level of care.  Communicate fall injury risk to interprofessional healthcare team.  Determine need for increased observation, equipment and environmental modification, such as low bed and signage, as well as supportive, nonskid footwear.  Adjust safety measures to individual developmental age, stage and identified risk factors.  Reinforce the importance of safety and physical activity with patient and family.  Perform regular intentional rounding to assess need for position change, pain assessment, personal needs, including assistance with toileting.  Recent Flowsheet Documentation  Taken 8/18/2021 1731 by Stephany Polanco, RN  Safety Promotion/Fall Prevention:   assistive device/personal items within reach   clutter free environment maintained   fall prevention program maintained   nonskid shoes/slippers when out of bed   room organization consistent   safety round/check completed  Taken 8/18/2021 1516 by Stephany Polanco, RN  Safety Promotion/Fall Prevention:   assistive device/personal items within reach   clutter free environment maintained   fall prevention program maintained   nonskid shoes/slippers when out of  bed   room organization consistent   safety round/check completed  Taken 8/18/2021 1315 by Stephany Polanco, RN  Safety Promotion/Fall Prevention:   assistive device/personal items within reach   clutter free environment maintained   fall prevention program maintained   nonskid shoes/slippers when out of bed   room organization consistent   safety round/check completed  Taken 8/18/2021 1104 by Stephany Polanco, RN  Safety Promotion/Fall Prevention:   assistive device/personal items within reach   clutter free environment maintained   fall prevention program maintained   nonskid shoes/slippers when out of bed   room organization consistent   safety round/check completed  Taken 8/18/2021 0915 by Stephany Polanco, RN  Safety Promotion/Fall Prevention:   assistive device/personal items within reach   clutter free environment maintained   activity supervised   fall prevention program maintained   nonskid shoes/slippers when out of bed   room organization consistent   safety round/check completed  Intervention: Prevent Skin Injury  Description: Assess skin risk on admission and at regular intervals throughout hospital stay.  Keep all areas of skin (especially folds) clean and dry.  Maintain adequate skin hydration.  Relieve and redistribute pressure and protect bony prominences; implement measures based on patient-specific risk factors.  Match turning and repositioning schedule to clinical condition.  Encourage weight shift frequently; assist with reposition if unable to complete independently.  Float heels off bed. Avoid pressure on the Achilles tendon.  Keep skin free from extended contact with medical devices.  Use aids (e.g., slide boards, mechanical lift) during transfer.  Recent Flowsheet Documentation  Taken 8/18/2021 1731 by Stephany Polanco, RN  Body Position: position changed independently  Skin Protection:   adhesive use limited   incontinence pads utilized   transparent dressing maintained    tubing/devices free from skin contact  Taken 8/18/2021 1516 by Stephany Polanco RN  Body Position: position changed independently  Skin Protection:   adhesive use limited   incontinence pads utilized   transparent dressing maintained   tubing/devices free from skin contact  Taken 8/18/2021 1315 by Stephany Polanco RN  Body Position: position changed independently  Skin Protection:   adhesive use limited   incontinence pads utilized   transparent dressing maintained   tubing/devices free from skin contact  Taken 8/18/2021 1130 by Stephany Polanco RN  Skin Protection: adhesive use limited  Taken 8/18/2021 1104 by Stephany Polanco RN  Body Position: position maintained  Skin Protection:   adhesive use limited   incontinence pads utilized   transparent dressing maintained   tubing/devices free from skin contact  Taken 8/18/2021 0915 by Stephany Polanco RN  Body Position: position changed independently  Skin Protection: adhesive use limited  Intervention: Prevent Infection  Description: Maintain skin and mucous membrane integrity; promote hand, oral and pulmonary hygiene.  Optimize fluid balance, nutrition, sleep and glycemic control to maximize infection resistance.  Identify potential sources of infection early to prevent or mitigate progression of infection (e.g., wound, lines, devices).  Evaluate ongoing need for invasive devices; remove promptly when no longer indicated.  Recent Flowsheet Documentation  Taken 8/18/2021 1731 by Stephany Polanco RN  Infection Prevention:   rest/sleep promoted   environmental surveillance performed  Taken 8/18/2021 1516 by Stephany Polanco RN  Infection Prevention:   rest/sleep promoted   environmental surveillance performed  Taken 8/18/2021 1315 by Stephany Polanco RN  Infection Prevention:   rest/sleep promoted   environmental surveillance performed  Taken 8/18/2021 1104 by Stephany Polanco RN  Infection Prevention:   rest/sleep promoted    environmental surveillance performed  Taken 8/18/2021 0915 by Stephany Polanco RN  Infection Prevention:   rest/sleep promoted   environmental surveillance performed   cohorting utilized  Goal: Optimal Comfort and Wellbeing  Intervention: Provide Person-Centered Care  Description: Use a family-focused approach to care.  Develop trust and rapport by proactively providing information, encouraging questions, addressing concerns and offering reassurance.  Acknowledge emotional response to hospitalization.  Recognize and utilize personal coping strategies.  Honor spiritual and cultural preferences.  Recent Flowsheet Documentation  Taken 8/18/2021 0915 by Stephany Polanco RN  Trust Relationship/Rapport:   care explained   choices provided   empathic listening provided   questions answered   thoughts/feelings acknowledged

## 2021-08-18 NOTE — CASE MANAGEMENT/SOCIAL WORK
Discharge Planning Assessment  [facility]     Patient Name: Lawrence Lieberman  MRN: [id number]  Today's Date: 8/18/2021    Admit Date: 8/17/2021    Discharge Needs Assessment     Row Name 08/18/21 0844       Living Environment    Lives With  significant other    Name(s) of Who Lives With Patient  Tamia Malik (SO) [phone]    Current Living Arrangements  home/apartment/condo    Primary Care Provided by  self    Provides Primary Care For  no one    Family Caregiver if Needed  significant other    Family Caregiver Names  Tamia Malik (SO)    Able to Return to Prior Arrangements  yes       Resource/Environmental Concerns    Resource/Environmental Concerns  none    Transportation Concerns  car, none       Transition Planning    Patient/Family Anticipates Transition to  home with family    Patient/Family Anticipated Services at Transition  none    Transportation Anticipated  family or friend will provide       Discharge Needs Assessment    Readmission Within the Last 30 Days  no previous admission in last 30 days    Equipment Currently Used at Home  none    Concerns to be Addressed  denies needs/concerns at this time    Anticipated Changes Related to Illness  none    Equipment Needed After Discharge  none        Discharge Plan     Row Name 08/18/21 9719       Plan    Plan  Home with family    Patient/Family in Agreement with Plan  yes    Plan Comments  Spoke with patient at bedside. Lives with Tamia Malik (JANNETH) [phone] in [place] Is independent with ADL's. No problems with [place] [employer] or medications. Uses no DME at home. No advanced directives. Plan is home with SO. CM will continue to follow.    Final Discharge Disposition Code  01 - home or self-care        Continued Care and Services - Admitted Since 8/17/2021    Coordination has not been started for this encounter.         Demographic Summary     Row Name 08/18/21 0843       General Information    Admission Type  inpatient    Arrived From   PACU/recovery room    Referral Source  admission list    Reason for Consult  discharge planning    Preferred Language  English     Used During This Interaction  no       Contact Information    Permission Granted to Share Info With      Contact Information Obtained for      Contact Information Comments  PCP is Chano Valverde        Functional Status     Row Name 08/18/21 0843       Functional Status    Usual Activity Tolerance  good    Current Activity Tolerance  good       Functional Status, IADL    Medications  independent    Meal Preparation  independent    Housekeeping  independent    Laundry  independent    Shopping  independent       Mental Status    General Appearance WDL  WDL       Mental Status Summary    Recent Changes in Mental Status/Cognitive Functioning  no changes       Employment/    Employment Status  employed full-time        Psychosocial    No documentation.       Abuse/Neglect    No documentation.       Legal    No documentation.       Substance Abuse    No documentation.       Patient Forms    No documentation.           Ten Mota RN

## 2021-08-18 NOTE — PLAN OF CARE
Goal Outcome Evaluation:  Plan of Care Reviewed With: patient        Progress: improving    Pts VSS.  Pt rested good in between care.  Pt states he is not in any real pain but he is sore.  Pt getting Toradol and Tylenol as scheduled.  States Toradol helps.  Pt walked at shift change last night.  Ostomy with serosanguinous drainage in it.  DOMINIC drain with serosanguinous drainage in it.  Abdominal dsg CDI. Pt is in no acute distress.   Will monitor....

## 2021-08-18 NOTE — PROGRESS NOTES
INFECTIOUS DISEASE CONSULT/INITIAL HOSPITAL VISIT    Lawrence Lieberman  1965  [id number]    Date of Consult: 8/18/2021    Admission Date: 8/17/2021      Requesting Provider: Brayan Ley MD  Evaluating Physician: Vic Brar MD    Reason for Consultation: Colovesicular fistula    History of present illness:    Patient is a 56 y.o. male with 3-month history of colovesicular fistula which began after memorial day patient has history of diverticulitis has been on antibiotics and is trying to cut back on tobacco use is gone down from using 2 packs/day down to 1 pack/day.  Patient taken for elective surgery patient was given dose of IV ertapenem prior to surgery is now on Zosyn patient has had intervention with a cystoscopy and bilateral stent placement today by urology followed by laparoscopic low anterior resection of colovesicular fistula with mobilization of splenic flexure and resection and culture of a pelvic abscess.  Patient had loop ileostomy and appendectomy today.    Patient is waking up from anesthesia reports the sensation need to urinate but has indwelling South catheter.  Patient is awake and alert family is by bedside patient denies fevers or chills.    8/18/2021 patient sitting up by the bedside is eating breakfast clear liquids more awake Today denies fevers rash sore throat diarrhea per wife patient is making some gas formation from loop ileostomy bag    Past Medical History:   Diagnosis Date   • Diabetes mellitus (CMS/HCC)    • Diverticulitis    • GERD (gastroesophageal reflux disease)    • Hearing loss    • History of kidney stones    • Hypertension    • Kidney stone        Past Surgical History:   Procedure Laterality Date   • COLON RESECTION N/A 8/17/2021    Procedure: LAPAROSCOPIC ASSISTED  LOW ANTERIOR RESECTION, MOBILIZATION OF SPLENIC FLEXURE, RESECTION OF COLOVESICAL FISTULA, APPENDECTOMY, LOOP IILEOSTOMY, PLACEMENT OF BILATERAL UROLOGIC CATHETERS;  Surgeon: Brayan Ley MD;   Location: [facility];  Service: General;  Laterality: N/A;   • COLONOSCOPY     • DENTAL PROCEDURE     • EYE SURGERY Right     eye socket surgery   • KIDNEY STONE SURGERY     • KNEE ARTHROSCOPY Bilateral        History reviewed. No pertinent family history.    Social History     Socioeconomic History   • Marital status: Single     Spouse name: Not on file   • Number of children: Not on file   • Years of education: Not on file   • Highest education level: Not on file   Tobacco Use   • Smoking status: Current Some Day Smoker     Packs/day: 1.00     Types: Cigarettes     Start date: 1984   • Smokeless tobacco: Never Used   • Tobacco comment: wearing a nicotine patch   Vaping Use   • Vaping Use: Never used   Substance and Sexual Activity   • Alcohol use: Never   • Drug use: Never   • Sexual activity: Defer       No Known Allergies      Medication:    Current Facility-Administered Medications:   •  acetaminophen (TYLENOL) tablet 650 mg, 650 mg, Oral, Q8H, Brayan Ley MD, 650 mg at 08/18/21 0917  •  allopurinol (ZYLOPRIM) tablet 100 mg, 100 mg, Oral, Daily, Gabi Feliz APRN, 100 mg at 08/18/21 0919  •  amLODIPine (NORVASC) tablet 5 mg, 5 mg, Oral, Daily, Gabi Feliz, APRN, 5 mg at 08/18/21 0919  •  dextrose (D50W) 25 g/ 50mL Intravenous Solution 25 g, 25 g, Intravenous, Q15 Min PRN, Gabi Feliz APRN  •  dextrose (GLUTOSE) oral gel 15 g, 15 g, Oral, Q15 Min PRN, Gabi Feliz APRN  •  diazePAM (VALIUM) tablet 5 mg, 5 mg, Oral, Q6H PRN, Brayan Ley MD, 5 mg at 08/17/21 1305  •  famotidine (PEPCID) tablet 20 mg, 20 mg, Oral, BID, Brayan Ley MD, 20 mg at 08/18/21 0919  •  glucagon (human recombinant) (GLUCAGEN DIAGNOSTIC) injection 1 mg, 1 mg, Subcutaneous, Q15 Min PRN, Gabi Feliz APRN  •  heparin (porcine) 5000 UNIT/ML injection 5,000 Units, 5,000 Units, Subcutaneous, Q8H, Brayan Ley MD, 5,000 Units at 08/18/21 0594  •  HYDROcodone-acetaminophen  (NORCO) 7.5-325 MG per tablet 1 tablet, 1 tablet, Oral, Q4H PRN, Brayan Ley MD, 1 tablet at 08/18/21 0918  •  HYDROmorphone (DILAUDID) injection 0.5 mg, 0.5 mg, Intravenous, Q2H PRN, 0.5 mg at 08/17/21 1632 **AND** naloxone (NARCAN) injection 0.4 mg, 0.4 mg, Intravenous, Q5 Min PRN, Brayan Ley MD  •  ibuprofen (ADVIL,MOTRIN) tablet 400 mg, 400 mg, Oral, Q6H PRN, Brayan Ley MD  •  insulin detemir (LEVEMIR) injection 10 Units, 10 Units, Subcutaneous, Nightly, Gabi Feliz APRN, 10 Units at 08/17/21 2100  •  insulin lispro (humaLOG) injection 0-7 Units, 0-7 Units, Subcutaneous, TID AC, Gabi Feliz APRN, 2 Units at 08/18/21 0919  •  ketorolac (TORADOL) injection 15 mg, 15 mg, Intravenous, Q6H, Brayan Ley MD, 15 mg at 08/18/21 0918  •  labetalol (NORMODYNE,TRANDATE) injection 10 mg, 10 mg, Intravenous, Q4H PRN, Gabi Feliz APRN  •  lactated ringers infusion, 9 mL/hr, Intravenous, Continuous, Sulema Woods DO, Last Rate: 9 mL/hr at 08/17/21 0651, New Bag at 08/17/21 1047  •  micafungin 100 mg/100 mL 0.9% NS IVPB (mbp), 100 mg, Intravenous, Q24H, Vic Brar MD, 100 mg at 08/17/21 1845  •  nicotine (NICODERM CQ) 14 MG/24HR patch 1 patch, 1 patch, Transdermal, Daily, Gabi Feliz APRN, 1 patch at 08/18/21 0918  •  ondansetron (ZOFRAN) tablet 4 mg, 4 mg, Oral, Q6H PRN **OR** ondansetron (ZOFRAN) injection 4 mg, 4 mg, Intravenous, Q6H PRN, Brayan Ley MD, 4 mg at 08/17/21 1409  •  piperacillin-tazobactam (ZOSYN) 3.375 g in iso-osmotic dextrose 50 ml (premix), 3.375 g, Intravenous, Q8H, Brayan Ley MD, 3.375 g at 08/18/21 0918  •  scopolamine patch 1 mg/72 hr, 1 patch, Transdermal, Continuous, Brayan Ley MD, 1 patch at 08/17/21 0659  •  sodium chloride 0.9 % bolus 500 mL, 500 mL, Intravenous, TID PRN, Gabi Feliz, APRN  •  sodium chloride 0.9 % flush 10 mL, 10 mL, Intravenous, Q12H, Vic Brar MD  •  sodium  chloride 0.9 % flush 10 mL, 10 mL, Intravenous, PRN, Vic Brar MD  •  sodium chloride 0.9 % with KCl 20 mEq/L infusion, 100 mL/hr, Intravenous, Continuous, Brayan Ley MD, Last Rate: 100 mL/hr at 08/17/21 1321, 100 mL/hr at 08/17/21 1321    Antibiotics:  Anti-Infectives (From admission, onward)    Ordered     Dose/Rate Route Frequency Start Stop    08/17/21 1159  piperacillin-tazobactam (ZOSYN) 3.375 g in iso-osmotic dextrose 50 ml (premix)     Florencio Montelongo, PharmD reviewed the order on 08/18/21 0905.   Ordering Provider: Brayan Ley MD    3.375 g  over 4 Hours Intravenous Every 8 Hours 08/17/21 1800 08/22/21 1759    08/17/21 1601  micafungin 100 mg/100 mL 0.9% NS IVPB (mbp)     Florencio Montelongo, PharmD reviewed the order on 08/18/21 0905.   Ordering Provider: Vic Brar MD    100 mg  over 60 Minutes Intravenous Every 24 Hours 08/17/21 1700 08/24/21 1659    08/17/21 0632  ertapenem (INVanz) 1 g/100 mL 0.9% NS VTB (mbp)     Ordering Provider: Brayan Ley MD    1 g  over 30 Minutes Intravenous Once 08/17/21 0634 08/17/21 0808            Review of Systems:    See HPI  Physical Exam:   Vital Signs  No data recorded.    No data recorded  BP  Min: 113/75  Max: 135/82  Pulse  Min: 87  Max: 87  Resp  Min: 18  Max: 18  SpO2  Min: 94 %  Max: 96 %    GENERAL: Awake and alert, in no acute distress.  Age-appropriate  HEENT: Normocephalic, atraumatic.  PERRL. EOMI. No conjunctival injection. No icterus.  No external oral lesions    HEART: RRR; No murmur,   LUNGS: Clear to auscultation bilaterally   ABDOMEN: Soft, nontender, nondistended.   EXT:  No cyanosis, clubbing or edema. No cord.    MSK: No joint deformity  SKIN: Warm and dry without cutaneous eruptions on Inspection/palpation.    NEURO: Oriented to PPT.  PSYCHIATRIC: Normal insight and judgement. Cooperative with PE    Laboratory Data    Results from last 7 days   Lab Units 08/18/21  0418 08/16/21  1046   WBC 10*3/mm3 15.53* 14.83*    HEMOGLOBIN g/dL 14.1 17.0   HEMATOCRIT % 44.7 51.3*   PLATELETS 10*3/mm3 211 252     Results from last 7 days   Lab Units 08/18/21  0418   SODIUM mmol/L 139   POTASSIUM mmol/L 4.7   CHLORIDE mmol/L 104   CO2 mmol/L 25.0   BUN mg/dL 10   CREATININE mg/dL 0.69*   GLUCOSE mg/dL 172*   CALCIUM mg/dL 9.2     Results from last 7 days   Lab Units 08/16/21  1046   ALK PHOS U/L 91   BILIRUBIN mg/dL 0.4   ALT (SGPT) U/L 10   AST (SGOT) U/L 20                         Estimated Creatinine Clearance: 161 mL/min (A) (by C-G formula based on SCr of 0.69 mg/dL (L)).      Microbiology:  No results found for: ACANTHNAEG, AFBCX, BPERTUSSISCX, BLOODCX  No results found for: BCIDPCR, CXREFLEX, CSFCX, CULTURETIS  No results found for: CULTURES, HSVCX, URCX  No results found for: EYECULTURE, GCCX, HSVCULTURE, LABHSV  No results found for: LEGIONELLA, MRSACX, MUMPSCX, MYCOPLASCX  No results found for: NOCARDIACX, STOOLCX  No results found for: THROATCX, UNSTIMCULT, URINECX, CULTURE, VZVCULTUR  No results found for: VIRALCULTU, WOUNDCX        Radiology:  Imaging Results (Last 72 Hours)     ** No results found for the last 72 hours. **            Impression:   Pelvic abscess  Colovesicular fistula  Status post laparoscopic low anterior resection of colovesicular fistula  Loop ileostomy  Tobacco abuse  Weight loss  Leukocytosis with neutrophilia  Status post cystectomy and bilateral stent placement  PLAN/RECOMMENDATIONS:   Thank you for asking us to see Lawrence Lieberman, I recommend the following:  Continue piperacillin/tazobactam cover for gram-negative rods and anaerobic process    Start micafungin 100 mg IV now on daily    Order PICC line for tomorrow    Anticipate 2 to 3 weeks of IV antibiotics  Order PICC line    Discussed case with family  Case discussed with patient's family and you show that the loop ileostomy is working properly and patient has toleration of oral intake before consideration of discharge       Vic Brar,  MD  8/18/2021  13:19 EDT

## 2021-08-18 NOTE — PROGRESS NOTES
Clinical Nutrition   Reason For Visit: Identified at risk by screening criteria, MST score 2+, Unintentional weight loss, Reduced oral intake    Patient Name: Lawrence Lieberman  YOB: 1965  MRN: [id number]  Date of Encounter: 08/18/21 12:32 EDT  Admission date: 8/17/2021      Nutrition Assessment     Admission Problem List:  Colovesical fistula  Pelvic abscess      Applicable PMH:  HTN, HLD  GERD  T2DM  Gout  Tobacco  Diverticulitis      Applicable medical tests/procedures since admission:  (8/17) s/p low anterior resection, resection of colovesical fistula, resection of pelvic abscess, mobilization of splenic flexure, appendectomy, loop ileostomy       Reported/Observed/Food/Nutrition Related History   Patient and significant other present during visit. They report that patient has been eating well/normal during the 5 days leading up to this admission, but very poorly during the 1 month prior to that. States he lost a significant amount of weight as a result. Reports weighing 282 lbs on (7/22), losing down to 254 lbs, and regaining to current weight 256 lbs. Has not been drinking any ONS at home - hasn't wanted to try them. Agrees to try vanilla/strawberry Premier Protein during this admission. Denies food allergies and difficulty chewing/swallowing. Tolerated clear liquids this morning. Will begin full liquid diet starting at lunch today. Advised patient to not force PO and to back off if he develops any N/V/abdominal pain. Significant other requests that ileostomy nutrition education be completed today.    >>>RD unable to provide education today - multiple attempts to see patient but patient/family busy with other hospital staff.    Anthropometrics   Height: 72 in  Weight: 256 lbs (standing weight 8/16 per ns doc)  BMI: 34.7  BMI classification: Obese Class I: 30-34.9kg/m2   IBW: 178 lbs    UBW: 282 lbs (7/22/21 per patient/significant other)    Weight change: Unintentional weight loss of 28 lbs  (9.9%) from 7/22/21-8/3/21 followed by recent weight regain of 2 lbs between 8/3/21-8/16/21    Labs reviewed   Labs reviewed: Yes    Medications reviewed   Medications reviewed: Yes  Pertinent: pepcid, antibiotic, insulin  GTT: NS with KCl @ 100 ml/hr, scopolamine patch  PRN: zofran    Needs Assessment (8/18)   Height used: 72 in  Weight used: 256 lbs/116 kg (actual wt);   178 lbs/81 kg (IBW)    Estimated Protein needs: ~160 g protein daily  2.0 g/kg IBW = 162  1.2-1.5 g/kg actual wt = 139-174      Current Nutrition Prescription   PO: Diet Full Liquid    Average PO intake: insufficient data    Nutrition Diagnosis     Problem Inadequate oral intake   Etiology S/p GI surgery this admission   Signs/Symptoms Liquid diets since admission yesterday (8/17)       Problem Food and nutrition knowledge deficit   Etiology No prior need for education   Signs/Symptoms S/p ileostomy this admission       Intervention   Intervention: Follow treatment progress, Care plan reviewed, Interview for preferences, Encourage intake, Supplement provided    -Ordered Premier Protein 2x daily.  -RD will provide ileostomy nutrition education prior to discharge.    Goal:   General: Nutrition support treatment  PO: Tolerate PO, Increase intake, Advace diet as medically appropriate    Monitoring/Evaluation:   Monitoring/Evaluation: Per protocol, I&O, PO intake, Supplement intake, Pertinent labs, Weight, GI status, Symptoms    Jessica Lozano RD  Time Spent: 45 min

## 2021-08-18 NOTE — THERAPY EVALUATION
Patient Name: Lawrence Lieberman  : 1965    MRN: [id number]                              Today's Date: 2021       Admit Date: 2021    Visit Dx:     ICD-10-CM ICD-9-CM   1. Colovesical fistula  N32.1 596.1     Patient Active Problem List   Diagnosis   • DM2 (diabetes mellitus, type 2) (CMS/HCC)   • Diverticulitis   • Hypertension   • Colovesical fistula   • S/P LAPAROSCOPIC LOW ANTERIOR, RESECTION OF COLOVESICAL FISTULA MOBILIZATION OF SPLENIC FLEXURE RESECTION     Past Medical History:   Diagnosis Date   • Diabetes mellitus (CMS/HCC)    • Diverticulitis    • GERD (gastroesophageal reflux disease)    • Hearing loss    • History of kidney stones    • Hypertension    • Kidney stone      Past Surgical History:   Procedure Laterality Date   • COLONOSCOPY     • DENTAL PROCEDURE     • EYE SURGERY Right     eye socket surgery   • KIDNEY STONE SURGERY     • KNEE ARTHROSCOPY Bilateral      General Information     Row Name 21          Physical Therapy Time and Intention    Document Type  evaluation  -LM     Mode of Treatment  individual therapy;physical therapy  -LM     Row Name 21          General Information    Patient Profile Reviewed  yes  -LM     Prior Level of Function  independent:;all household mobility;gait;ADL's  -LM     Existing Precautions/Restrictions  other (see comments) Ileostomy; ODMINIC Drain  -LM     Barriers to Rehab  none identified  -LM     Row Name 21          Living Environment    Lives With  significant other  -LM     Row Name 21          Home Main Entrance    Number of Stairs, Main Entrance  six  -LM     Stair Railings, Main Entrance  railings on both sides of stairs  -LM     Row Name 21          Stairs Within Home, Primary    Number of Stairs, Within Home, Primary  none  -LM     Row Name 21          Cognition    Orientation Status (Cognition)  oriented x 4  -LM     Row Name 21          Safety Issues, Functional  Mobility    Impairments Affecting Function (Mobility)  endurance/activity tolerance;pain  -LM       User Key  (r) = Recorded By, (t) = Taken By, (c) = Cosigned By    Initials Name Provider Type    Jess Rainey PT Physical Therapist        Mobility     Row Name 08/18/21 0830          Bed Mobility    Bed Mobility  sit-supine  -LM     Sit-Supine [place] (Bed Mobility)  minimum assist (75% patient effort);1 person assist;verbal cues  -LM     Comment (Bed Mobility)  Pt already sitting EOB upon entering room.  HOB flat; No BRs with sit-->supine.  -LM     Row Name 08/18/21 0830          Transfers    Comment (Transfers)  Stood x 2 from EOB.  Good hand placement noted.  -LM     Row Name 08/18/21 0830          Sit-Stand Transfer    Sit-Stand [place] (Transfers)  minimum assist (75% patient effort);1 person assist;verbal cues  -LM     Assistive Device (Sit-Stand Transfers)  walker, front-wheeled  -LM     Row Name 08/18/21 0830          Gait/Stairs (Locomotion)    [place] Level (Gait)  contact guard;1 person assist  -LM     Assistive Device (Gait)  walker, front-wheeled  -LM     Distance in Feet (Gait)  240 feet  -LM     Deviations/Abnormal Patterns (Gait)  stride length decreased  -LM     Bilateral Gait Deviations  forward flexed posture  -LM     Comment (Gait/Stairs)  Vc's for upright posture.  Pt limited d/t pain.  -LM       User Key  (r) = Recorded By, (t) = Taken By, (c) = Cosigned By    Initials Name Provider Type    Jess Rainey PT Physical Therapist        Obj/Interventions     Row Name 08/18/21 0830          Range of Motion Comprehensive    General Range of Motion  bilateral lower extremity ROM WFL  -LM     Row Name 08/18/21 0830          Strength Comprehensive (MMT)    General Manual Muscle Testing (MMT) Assessment  no strength deficits identified BLEs  -LM     Row Name 08/18/21 0830          Balance    Balance Assessment  sitting static balance;standing static balance;standing dynamic balance   -LM     Static Sitting Balance  WFL  -LM     Static Standing Balance  WFL;supported  -LM     Dynamic Standing Balance  WFL;supported  -LM     Row Name 08/18/21 0830          Sensory Assessment (Somatosensory)    Sensory Assessment (Somatosensory)  LE sensation intact  -LM       User Key  (r) = Recorded By, (t) = Taken By, (c) = Cosigned By    Initials Name Provider Type    LM Ritchie, Jess, PT Physical Therapist        Goals/Plan     Row Name 08/18/21 0830          Bed Mobility Goal 1 (PT)    Activity/Assistive Device (Bed Mobility Goal 1, PT)  sit to supine/supine to sit  -LM     [place] Level/Cues Needed (Bed Mobility Goal 1, PT)  independent  -LM     Time Frame (Bed Mobility Goal 1, PT)  long term goal (LTG);10 days  -LM     Row Name 08/18/21 0830          Transfer Goal 1 (PT)    Activity/Assistive Device (Transfer Goal 1, PT)  bed-to-chair/chair-to-bed  -LM     [place] Level/Cues Needed (Transfer Goal 1, PT)  independent  -LM     Time Frame (Transfer Goal 1, PT)  long term goal (LTG);10 days  -LM     Row Name 08/18/21 0830          Gait Training Goal 1 (PT)    Activity/Assistive Device (Gait Training Goal 1, PT)  gait (walking locomotion)  -LM     [place] Level (Gait Training Goal 1, PT)  independent  -LM     Distance (Gait Training Goal 1, PT)  400 feet  -LM     Time Frame (Gait Training Goal 1, PT)  long term goal (LTG);10 days  -LM     Row Name 08/18/21 0830          Stairs Goal 1 (PT)    Activity/Assistive Device (Stairs Goal 1, PT)  ascending stairs;descending stairs;using handrail, left;using handrail, right  -LM     [place] Level/Cues Needed (Stairs Goal 1, PT)  modified independence  -LM     Number of Stairs (Stairs Goal 1, PT)  6  -LM     Time Frame (Stairs Goal 1, PT)  long term goal (LTG);10 days  -LM       User Key  (r) = Recorded By, (t) = Taken By, (c) = Cosigned By    Initials Name Provider Type    LM Ritchie, Jess, PT Physical Therapist        Clinical Impression     Row Name  08/18/21 0830          Pain    Additional Documentation  Pain Scale: Numbers Pre/Post-Treatment (Group)  -LM     Row Name 08/18/21 0830          Pain Scale: Numbers Pre/Post-Treatment    Pretreatment Pain Rating  5/10  -LM     Posttreatment Pain Rating  3/10  -LM     Pain Location - Orientation  incisional  -LM     Pain Location  abdomen  -LM     Pain Intervention(s)  Repositioned;Ambulation/increased activity  -LM     Row Name 08/18/21 0830          Plan of Care Review    Plan of Care Reviewed With  patient  -LM     Outcome Summary  PT evaluation completed.  Pt stood with MinAx1, ambulated 240 feet using rw with CGAx1, and transferred sit-->supine with MinAx1.  Skilled PT services warranted to improve mobility and safety.  Recommend home with assist at d/c.  -LM     Row Name 08/18/21 0830          Therapy Assessment/Plan (PT)    Rehab Potential (PT)  good, to achieve stated therapy goals  -LM     Criteria for Skilled Interventions Met (PT)  yes;meets criteria;skilled treatment is necessary  -LM     Row Name 08/18/21 0830          Vital Signs    Pre Systolic BP Rehab  123  -LM     Pre Treatment Diastolic BP  73  -LM     Pretreatment Heart Rate (beats/min)  75  -LM     Posttreatment Heart Rate (beats/min)  70  -LM     Pre SpO2 (%)  91  -LM     O2 Delivery Pre Treatment  room air  -LM     Post SpO2 (%)  93  -LM     O2 Delivery Post Treatment  room air  -LM     Pre Patient Position  Sitting  -LM     Post Patient Position  Supine  -LM     Row Name 08/18/21 0830          Positioning and Restraints    Pre-Treatment Position  in bed  -LM     Post Treatment Position  bed  -LM     In Bed  supine;call light within reach;encouraged to call for assist;with family/caregiver;notified nsg  -LM       User Key  (r) = Recorded By, (t) = Taken By, (c) = Cosigned By    Initials Name Provider Type    LM Jess Dugan, PT Physical Therapist        Outcome Measures     Row Name 08/18/21 0830          How much help from another person do  you currently need...    Turning from your back to your side while in flat bed without using bedrails?  2  -LM     Moving from lying on back to sitting on the side of a flat bed without bedrails?  2  -LM     Moving to and from a bed to a chair (including a wheelchair)?  3  -LM     Standing up from a chair using your arms (e.g., wheelchair, bedside chair)?  3  -LM     Climbing 3-5 steps with a railing?  3  -LM     To walk in hospital room?  3  -LM     AM-PAC 6 Clicks Score (PT)  16  -LM     Row Name 08/18/21 0830          Functional Assessment    Outcome Measure Options  AM-PAC 6 Clicks Basic Mobility (PT)  -LM       User Key  (r) = Recorded By, (t) = Taken By, (c) = Cosigned By    Initials Name Provider Type    LM Jess Dugan, ALINE Physical Therapist                       Physical Therapy Education                 Title: PT OT SLP Therapies (Done)     Topic: Physical Therapy (Done)     Point: Mobility training (Done)     Learning Progress Summary           Patient Acceptance, E, VU,DU by  at 8/18/2021 0921                   Point: Precautions (Done)     Learning Progress Summary           Patient Acceptance, E, VU,DU by  at 8/18/2021 0921                               User Key     Initials Effective Dates Name Provider Type Discipline     06/16/21 -  Jess Dugan PT Physical Therapist PT              PT Recommendation and Plan  Planned Therapy Interventions (PT): balance training, bed mobility training, gait training, home exercise program, motor coordination training, neuromuscular re-education, patient/family education, postural re-education, ROM (range of motion), stair training, strengthening, stretching, transfer training  Plan of Care Reviewed With: patient  Outcome Summary: PT evaluation completed.  Pt stood with MinAx1, ambulated 240 feet using rw with CGAx1, and transferred sit-->supine with MinAx1.  Skilled PT services warranted to improve mobility and safety.  Recommend home with assist at  d/c.     Time Calculation:   PT Charges     Row Name 08/18/21 0830             Time Calculation    Start Time  0830  -LM      PT Received On  08/18/21  -LM      PT Goal Re-Cert Due Date  08/28/21  -LM         Untimed Charges    PT Eval/Re-eval Minutes  50  -LM         Total Minutes    Untimed Charges Total Minutes  50  -LM       Total Minutes  50  -LM        User Key  (r) = Recorded By, (t) = Taken By, (c) = Cosigned By    Initials Name Provider Type    LM Jess Dugan, PT Physical Therapist        Therapy Charges for Today     Code Description Service Date Service Provider Modifiers Qty    98605249744 HC PT EVAL LOW COMPLEXITY 4 8/18/2021 Jess Dugan, PT GP 1          PT G-Codes  Outcome Measure Options: AM-PAC 6 Clicks Basic Mobility (PT)  AM-PAC 6 Clicks Score (PT): 16    Jess Dugan PT  8/18/2021

## 2021-08-18 NOTE — NURSING NOTE
Wocn follow up for ostomy education    Surgery date: 08/17/2021  Ostomy type: loop ileostomy    Stoma size & Ax: 25 mm tall x 38mm wide, protruding above skin level, bridge in place, red rubber catheter in place, serosanguinous drainage in pouch, pouch leaking due to oozing around bridge suture    Appliance in place: coloplast one piece drainable  Last pouch change: 08/18/2021    Education performed: ordering, pouching, showering, hydrating, electrolyting, emptying, stoma paste usage. patient and wife with lots of questions. Answered to best of wocn ability, patient and wife eager to learn, patient accepting of stoma, questions about return to work. wocn encouraged by positive attitude of anai.   Supplies, cutting, resource folder and catalogue review still need to be done.       Wocn will continue to follow. Please contact with questions or concerns.

## 2021-08-19 LAB
ANION GAP SERPL CALCULATED.3IONS-SCNC: 9 MMOL/L (ref 5–15)
BASOPHILS # BLD AUTO: 0.06 10*3/MM3 (ref 0–0.2)
BASOPHILS NFR BLD AUTO: 0.4 % (ref 0–1.5)
BUN SERPL-MCNC: 15 MG/DL (ref 6–20)
BUN/CREAT SERPL: 22.1 (ref 7–25)
CALCIUM SPEC-SCNC: 8.5 MG/DL (ref 8.6–10.5)
CHLORIDE SERPL-SCNC: 106 MMOL/L (ref 98–107)
CO2 SERPL-SCNC: 23 MMOL/L (ref 22–29)
CREAT SERPL-MCNC: 0.68 MG/DL (ref 0.76–1.27)
DEPRECATED RDW RBC AUTO: 46.6 FL (ref 37–54)
EOSINOPHIL # BLD AUTO: 0.14 10*3/MM3 (ref 0–0.4)
EOSINOPHIL NFR BLD AUTO: 1 % (ref 0.3–6.2)
ERYTHROCYTE [DISTWIDTH] IN BLOOD BY AUTOMATED COUNT: 14.1 % (ref 12.3–15.4)
GFR SERPL CREATININE-BSD FRML MDRD: 122 ML/MIN/1.73
GLUCOSE BLDC GLUCOMTR-MCNC: 109 MG/DL (ref 70–130)
GLUCOSE BLDC GLUCOMTR-MCNC: 115 MG/DL (ref 70–130)
GLUCOSE BLDC GLUCOMTR-MCNC: 121 MG/DL (ref 70–130)
GLUCOSE BLDC GLUCOMTR-MCNC: 159 MG/DL (ref 70–130)
GLUCOSE SERPL-MCNC: 112 MG/DL (ref 65–99)
HCT VFR BLD AUTO: 39.2 % (ref 37.5–51)
HGB BLD-MCNC: 12.8 G/DL (ref 13–17.7)
IMM GRANULOCYTES # BLD AUTO: 0.05 10*3/MM3 (ref 0–0.05)
IMM GRANULOCYTES NFR BLD AUTO: 0.3 % (ref 0–0.5)
LYMPHOCYTES # BLD AUTO: 4.37 10*3/MM3 (ref 0.7–3.1)
LYMPHOCYTES NFR BLD AUTO: 30.1 % (ref 19.6–45.3)
MCH RBC QN AUTO: 29.6 PG (ref 26.6–33)
MCHC RBC AUTO-ENTMCNC: 32.7 G/DL (ref 31.5–35.7)
MCV RBC AUTO: 90.5 FL (ref 79–97)
MONOCYTES # BLD AUTO: 1.2 10*3/MM3 (ref 0.1–0.9)
MONOCYTES NFR BLD AUTO: 8.3 % (ref 5–12)
NEUTROPHILS NFR BLD AUTO: 59.9 % (ref 42.7–76)
NEUTROPHILS NFR BLD AUTO: 8.72 10*3/MM3 (ref 1.7–7)
NRBC BLD AUTO-RTO: 0 /100 WBC (ref 0–0.2)
PLATELET # BLD AUTO: 205 10*3/MM3 (ref 140–450)
PMV BLD AUTO: 10.7 FL (ref 6–12)
POTASSIUM SERPL-SCNC: 4.1 MMOL/L (ref 3.5–5.2)
RBC # BLD AUTO: 4.33 10*6/MM3 (ref 4.14–5.8)
SODIUM SERPL-SCNC: 138 MMOL/L (ref 136–145)
WBC # BLD AUTO: 14.54 10*3/MM3 (ref 3.4–10.8)

## 2021-08-19 PROCEDURE — 25010000002 PIPERACILLIN SOD-TAZOBACTAM PER 1 G: Performed by: COLON & RECTAL SURGERY

## 2021-08-19 PROCEDURE — 85025 COMPLETE CBC W/AUTO DIFF WBC: CPT | Performed by: COLON & RECTAL SURGERY

## 2021-08-19 PROCEDURE — 63710000001 INSULIN DETEMIR PER 5 UNITS

## 2021-08-19 PROCEDURE — 25010000002 HEPARIN (PORCINE) PER 1000 UNITS: Performed by: COLON & RECTAL SURGERY

## 2021-08-19 PROCEDURE — 25010000002 MICAFUNGIN SODIUM 100 MG RECONSTITUTED SOLUTION: Performed by: INTERNAL MEDICINE

## 2021-08-19 PROCEDURE — 25010000002 SODIUM CHLORIDE 0.9 % WITH KCL 20 MEQ 20-0.9 MEQ/L-% SOLUTION: Performed by: COLON & RECTAL SURGERY

## 2021-08-19 PROCEDURE — 80048 BASIC METABOLIC PNL TOTAL CA: CPT | Performed by: COLON & RECTAL SURGERY

## 2021-08-19 PROCEDURE — 82962 GLUCOSE BLOOD TEST: CPT

## 2021-08-19 PROCEDURE — 63710000001 INSULIN LISPRO (HUMAN) PER 5 UNITS

## 2021-08-19 PROCEDURE — 25010000002 KETOROLAC TROMETHAMINE PER 15 MG: Performed by: COLON & RECTAL SURGERY

## 2021-08-19 RX ORDER — SIMETHICONE 80 MG
80 TABLET,CHEWABLE ORAL 4 TIMES DAILY PRN
Status: DISCONTINUED | OUTPATIENT
Start: 2021-08-19 | End: 2021-08-20 | Stop reason: HOSPADM

## 2021-08-19 RX ADMIN — ACETAMINOPHEN 650 MG: 325 TABLET, FILM COATED ORAL at 09:50

## 2021-08-19 RX ADMIN — ALLOPURINOL 100 MG: 100 TABLET ORAL at 09:50

## 2021-08-19 RX ADMIN — NICOTINE 1 PATCH: 14 PATCH, EXTENDED RELEASE TRANSDERMAL at 09:54

## 2021-08-19 RX ADMIN — INSULIN DETEMIR 10 UNITS: 100 INJECTION, SOLUTION SUBCUTANEOUS at 20:30

## 2021-08-19 RX ADMIN — AMLODIPINE BESYLATE 5 MG: 5 TABLET ORAL at 09:50

## 2021-08-19 RX ADMIN — HEPARIN SODIUM 5000 UNITS: 5000 INJECTION, SOLUTION INTRAVENOUS; SUBCUTANEOUS at 05:14

## 2021-08-19 RX ADMIN — SIMETHICONE 80 MG: 80 TABLET, CHEWABLE ORAL at 23:01

## 2021-08-19 RX ADMIN — ACETAMINOPHEN 650 MG: 325 TABLET, FILM COATED ORAL at 16:35

## 2021-08-19 RX ADMIN — FAMOTIDINE 20 MG: 20 TABLET ORAL at 20:29

## 2021-08-19 RX ADMIN — KETOROLAC TROMETHAMINE 15 MG: 15 INJECTION, SOLUTION INTRAMUSCULAR; INTRAVENOUS at 01:07

## 2021-08-19 RX ADMIN — HYDROCODONE BITARTRATE AND ACETAMINOPHEN 1 TABLET: 7.5; 325 TABLET ORAL at 22:26

## 2021-08-19 RX ADMIN — POTASSIUM CHLORIDE AND SODIUM CHLORIDE 100 ML/HR: 900; 150 INJECTION, SOLUTION INTRAVENOUS at 23:31

## 2021-08-19 RX ADMIN — FAMOTIDINE 20 MG: 20 TABLET ORAL at 09:50

## 2021-08-19 RX ADMIN — MICAFUNGIN SODIUM 100 MG: 100 INJECTION, POWDER, LYOPHILIZED, FOR SOLUTION INTRAVENOUS at 18:07

## 2021-08-19 RX ADMIN — KETOROLAC TROMETHAMINE 15 MG: 15 INJECTION, SOLUTION INTRAMUSCULAR; INTRAVENOUS at 09:50

## 2021-08-19 RX ADMIN — INSULIN LISPRO 2 UNITS: 100 INJECTION, SOLUTION INTRAVENOUS; SUBCUTANEOUS at 18:08

## 2021-08-19 RX ADMIN — HEPARIN SODIUM 5000 UNITS: 5000 INJECTION, SOLUTION INTRAVENOUS; SUBCUTANEOUS at 21:30

## 2021-08-19 RX ADMIN — HEPARIN SODIUM 5000 UNITS: 5000 INJECTION, SOLUTION INTRAVENOUS; SUBCUTANEOUS at 14:52

## 2021-08-19 RX ADMIN — ACETAMINOPHEN 650 MG: 325 TABLET, FILM COATED ORAL at 01:07

## 2021-08-19 RX ADMIN — HEPARIN SODIUM 5000 UNITS: 5000 INJECTION, SOLUTION INTRAVENOUS; SUBCUTANEOUS at 20:29

## 2021-08-19 RX ADMIN — TAZOBACTAM SODIUM AND PIPERACILLIN SODIUM 3.38 G: 375; 3 INJECTION, SOLUTION INTRAVENOUS at 01:07

## 2021-08-19 RX ADMIN — TAZOBACTAM SODIUM AND PIPERACILLIN SODIUM 3.38 G: 375; 3 INJECTION, SOLUTION INTRAVENOUS at 09:50

## 2021-08-19 NOTE — PLAN OF CARE
Goal Outcome Evaluation:  VSS, room air, alert and oriented. Pt denies needs for PRN pain meds, scheduled pain meds effective per pt. Pt ambulated in the shin 2x today. UOP good, clear yellow. Stoma output looking dark brown, liquid. Pt tolerating reg low residue diet well. Denies any nausea. Incentive spirometer in use. Will continue to monitor.   Problem: Adult Inpatient Plan of Care  Goal: Plan of Care Review  Outcome: Ongoing, Progressing  Goal: Patient-Specific Goal (Individualized)  Outcome: Ongoing, Progressing  Goal: Absence of Hospital-Acquired Illness or Injury  Outcome: Ongoing, Progressing  Intervention: Identify and Manage Fall Risk  Description: Perform standard risk assessment with a validated tool or comprehensive approach appropriate to the patient on admission; reassess fall risk frequently, with change in status or transfer to another level of care.  Communicate fall injury risk to interprofessional healthcare team.  Determine need for increased observation, equipment and environmental modification, such as low bed and signage, as well as supportive, nonskid footwear.  Adjust safety measures to individual developmental age, stage and identified risk factors.  Reinforce the importance of safety and physical activity with patient and family.  Perform regular intentional rounding to assess need for position change, pain assessment, personal needs, including assistance with toileting.  Recent Flowsheet Documentation  Taken 8/19/2021 1635 by Stephany Polanco, RN  Safety Promotion/Fall Prevention:   assistive device/personal items within reach   clutter free environment maintained   fall prevention program maintained   nonskid shoes/slippers when out of bed   room organization consistent   safety round/check completed  Taken 8/19/2021 1521 by Stephany Polanco, RN  Safety Promotion/Fall Prevention:   assistive device/personal items within reach   clutter free environment maintained   fall  prevention program maintained   nonskid shoes/slippers when out of bed   room organization consistent   safety round/check completed  Taken 8/19/2021 1230 by Stephany Polanco, RN  Safety Promotion/Fall Prevention:   assistive device/personal items within reach   clutter free environment maintained   fall prevention program maintained   nonskid shoes/slippers when out of bed   room organization consistent   safety round/check completed  Taken 8/19/2021 0950 by Stephany Polanco, RN  Safety Promotion/Fall Prevention:   assistive device/personal items within reach   clutter free environment maintained   fall prevention program maintained   nonskid shoes/slippers when out of bed   room organization consistent   safety round/check completed  Taken 8/19/2021 0712 by Stephany Polanco, RN  Safety Promotion/Fall Prevention:   activity supervised   assistive device/personal items within reach   clutter free environment maintained   fall prevention program maintained   nonskid shoes/slippers when out of bed   room organization consistent   safety round/check completed  Intervention: Prevent Skin Injury  Description: Assess skin risk on admission and at regular intervals throughout hospital stay.  Keep all areas of skin (especially folds) clean and dry.  Maintain adequate skin hydration.  Relieve and redistribute pressure and protect bony prominences; implement measures based on patient-specific risk factors.  Match turning and repositioning schedule to clinical condition.  Encourage weight shift frequently; assist with reposition if unable to complete independently.  Float heels off bed. Avoid pressure on the Achilles tendon.  Keep skin free from extended contact with medical devices.  Use aids (e.g., slide boards, mechanical lift) during transfer.  Recent Flowsheet Documentation  Taken 8/19/2021 1635 by Stephany Polanco, RN  Body Position: position changed independently  Skin Protection:   adhesive use limited    incontinence pads utilized   transparent dressing maintained   tubing/devices free from skin contact  Taken 8/19/2021 1521 by Stephany Polanco RN  Body Position: position changed independently  Skin Protection:   adhesive use limited   incontinence pads utilized   transparent dressing maintained   tubing/devices free from skin contact  Taken 8/19/2021 1230 by Stephany Polanco RN  Body Position: position changed independently  Skin Protection: adhesive use limited  Taken 8/19/2021 0950 by Stephany Polanco RN  Body Position: position changed independently  Skin Protection: adhesive use limited  Taken 8/19/2021 0712 by Stephany Polanco RN  Skin Protection: adhesive use limited  Intervention: Prevent Infection  Description: Maintain skin and mucous membrane integrity; promote hand, oral and pulmonary hygiene.  Optimize fluid balance, nutrition, sleep and glycemic control to maximize infection resistance.  Identify potential sources of infection early to prevent or mitigate progression of infection (e.g., wound, lines, devices).  Evaluate ongoing need for invasive devices; remove promptly when no longer indicated.  Recent Flowsheet Documentation  Taken 8/19/2021 1635 by Stephany Polanco RN  Infection Prevention:   rest/sleep promoted   environmental surveillance performed  Taken 8/19/2021 1521 by Stephany Polanco RN  Infection Prevention:   rest/sleep promoted   environmental surveillance performed  Taken 8/19/2021 1230 by Stephany Polanco RN  Infection Prevention:   rest/sleep promoted   environmental surveillance performed  Taken 8/19/2021 0950 by Stephany Polanco RN  Infection Prevention:   rest/sleep promoted   cohorting utilized   environmental surveillance performed  Taken 8/19/2021 0712 by Stephany Polanco RN  Infection Prevention:   rest/sleep promoted   environmental surveillance performed  Goal: Optimal Comfort and Wellbeing  Outcome: Ongoing, Progressing  Intervention:  Provide Person-Centered Care  Description: Use a family-focused approach to care.  Develop trust and rapport by proactively providing information, encouraging questions, addressing concerns and offering reassurance.  Acknowledge emotional response to hospitalization.  Recognize and utilize personal coping strategies.  Honor spiritual and cultural preferences.  Recent Flowsheet Documentation  Taken 8/19/2021 0712 by Stephany Polanco RN  Trust Relationship/Rapport:   care explained   choices provided   empathic listening provided   questions answered   thoughts/feelings acknowledged  Goal: Readiness for Transition of Care  Outcome: Ongoing, Progressing     Problem: Diabetes Comorbidity  Goal: Blood Glucose Level Within Desired Range  Outcome: Ongoing, Progressing     Problem: Hypertension Comorbidity  Goal: Blood Pressure in Desired Range  Outcome: Ongoing, Progressing  Intervention: Maintain Hypertension-Management Strategies  Description: Evaluate adherence to home antihypertensive regimen (e.g., exercise and activity, diet modification, medication).  Provide scheduled antihypertensive medication; consider administration time and effects (e.g., avoid giving diuretic prior to bedtime).  Monitor response to medication therapy (e.g., blood pressure, electrolyte level, medication effects).  Minimize risk of orthostatic hypotension; encourage caution with position changes, particularly if elderly  Recent Flowsheet Documentation  Taken 8/19/2021 1635 by Stephany Polanco, RN  Medication Review/Management: medications reviewed  Taken 8/19/2021 1521 by Stephany Polanco RN  Medication Review/Management: medications reviewed  Taken 8/19/2021 1230 by Stephany Polanco, RN  Medication Review/Management: medications reviewed  Taken 8/19/2021 0950 by Stephany Polanco, RN  Medication Review/Management: medications reviewed  Taken 8/19/2021 0712 by Stephany Polanco, RN  Medication Review/Management: dosing  adjusted     Problem: Obstructive Sleep Apnea Risk or Actual (Comorbidity Management)  Goal: Unobstructed Breathing During Sleep  Outcome: Ongoing, Progressing

## 2021-08-19 NOTE — PROGRESS NOTES
"[facility] ([facility])      No acute difficulties  Good tolerance of diet, good stoma function, reasonable drain output, good activity level.    Vital Signs:  Blood pressure 133/84, pulse 51, temperature 98.1 °F (36.7 °C), temperature source Oral, resp. rate 19, height 182.9 cm (72\"), weight 116 kg (256 lb), SpO2 92 %.    Labs past 24 hours:  Lab Results (last 24 hours)     Procedure Component Value Units Date/Time    POC Glucose Once [[id number]]  (Abnormal) Collected: 08/19/21 1646    Specimen: Blood Updated: 08/19/21 1648     Glucose 159 mg/dL      Comment: Meter: [id number] : [id number] Shakir Parks       POC Glucose Once [[id number]]  (Normal) Collected: 08/19/21 1142    Specimen: Blood Updated: 08/19/21 1144     Glucose 121 mg/dL      Comment: Meter: [id number] : [id number] Shakir Parks       Tissue / Bone Culture - Tissue, Large Intestine, Sigmoid Colon [[id number]]  (Abnormal) Collected: 08/17/21 1015    Specimen: Tissue from Large Intestine, Sigmoid Colon Updated: 08/19/21 0847     Tissue Culture Light growth (2+) Enterococcus species     Gram Stain Rare (1+) WBCs seen      No organisms seen    POC Glucose Once [[id number]]  (Normal) Collected: 08/19/21 0743    Specimen: Blood Updated: 08/19/21 0744     Glucose 109 mg/dL      Comment: Meter: [id number] : [id number] Shakir Parks       Basic Metabolic Panel [[id number]]  (Abnormal) Collected: 08/19/21 0356    Specimen: Blood Updated: 08/19/21 0507     Glucose 112 mg/dL      BUN 15 mg/dL      Creatinine 0.68 mg/dL      Sodium 138 mmol/L      Potassium 4.1 mmol/L      Chloride 106 mmol/L      CO2 23.0 mmol/L      Calcium 8.5 mg/dL      eGFR Non African Amer 122 mL/min/1.73      BUN/Creatinine Ratio 22.1     Anion Gap 9.0 mmol/L     Narrative:      GFR Normal >60  Chronic Kidney Disease <60  Kidney Failure <15      CBC & Differential [[id number]]  (Abnormal) Collected: 08/19/21 0356    Specimen: Blood Updated: " 08/19/21 0430    Narrative:      The following orders were created for panel order CBC & Differential.  Procedure                               Abnormality         Status                     ---------                               -----------         ------                     CBC Auto Differential[[id number]]        Abnormal            Final result                 Please view results for these tests on the individual orders.    CBC Auto Differential [[id number]]  (Abnormal) Collected: 08/19/21 0356    Specimen: Blood Updated: 08/19/21 0430     WBC 14.54 10*3/mm3      RBC 4.33 10*6/mm3      Hemoglobin 12.8 g/dL      Hematocrit 39.2 %      MCV 90.5 fL      MCH 29.6 pg      MCHC 32.7 g/dL      RDW 14.1 %      RDW-SD 46.6 fl      MPV 10.7 fL      Platelets 205 10*3/mm3      Neutrophil % 59.9 %      Lymphocyte % 30.1 %      Monocyte % 8.3 %      Eosinophil % 1.0 %      Basophil % 0.4 %      Immature Grans % 0.3 %      Neutrophils, Absolute 8.72 10*3/mm3      Lymphocytes, Absolute 4.37 10*3/mm3      Monocytes, Absolute 1.20 10*3/mm3      Eosinophils, Absolute 0.14 10*3/mm3      Basophils, Absolute 0.06 10*3/mm3      Immature Grans, Absolute 0.05 10*3/mm3      nRBC 0.0 /100 WBC           I/O last shift:  I/O this shift:  In: 1250 [I.V.:1250]  Out: 950 [Urine:950]     PHYSICAL EXAM-  Comfortable  cv- rsr, not tachy  Abd- soft, mild distention, good stoma function, incision okay    Pathology:  Order Name Source Comment Collection Info Order Time   ANAEROBIC CULTURE Large Intestine, Sigmoid Colon  Collected By: Brayan Ley MD 8/17/2021 10:15 AM     Release to patient   Immediate        FUNGUS CULTURE Large Intestine, Sigmoid Colon  Collected By: Brayan Ley MD 8/17/2021 10:15 AM     Release to patient   Immediate        TISSUE / BONE CULTURE Large Intestine, Sigmoid Colon  Collected By: Brayan Ley MD 8/17/2021 10:15 AM     Release to patient   Immediate        AFB CULTURE Large Intestine, Sigmoid Colon  Collected  By: Brayan Ley MD 8/17/2021 10:15 AM     Release to patient   Immediate        ABO/RH SPECIMEN VERIFICATION PREOP   Collected By: Bhavana Orozco RN 8/17/2021  6:32 AM     Release to patient   Immediate        TISSUE PATHOLOGY EXAM Large Intestine, Sigmoid Colon  Collected By: Brayan Ley MD 8/17/2021 10:14 AM     Release to patient   Immediate        .    Assessment and Plan:  Doing well  Plan for bridge removal from the stoma tomorrow  DOMINIC drain removal tomorrow, South removal tomorrow-both at 6 AM  Discharge instructions reviewed, antibiotics per ID  Office follow-up in 2 weeks.  The me know if any questions or difficulties impeding discharge tomorrow--discharge after further instruction from stomal therapy.  ty    Brayan Ley MD  08/19/21  19:45 EDT

## 2021-08-19 NOTE — PROGRESS NOTES
Nutrition Services    Patient Name:  Lawrence Lieberman  YOB: 1965  MRN: [id number]  Admit Date:  8/17/2021    RD provided patient and significant other with ileostomy nutrition education and written materials. RD reviewed low-fiber diet x 4-6 weeks post-op, slow re-introduction of high-fiber foods after 4-6 weeks, daily chewable/liquid MVI, high ileostomy output/dehydration/oral rehydration solutions, dietary strategies for managing high ileostomy output, food lists pertaining to gas/odor/blockage/diarrhea and stool thickening, appropriate oral nutrition supplements, and recommended daily protein intake for post-op wound healing.    Will continue to follow.    Electronically signed by:  Jessica Lozano RD  08/19/21 12:38 EDT

## 2021-08-19 NOTE — PROGRESS NOTES
INFECTIOUS DISEASE CONSULT/INITIAL HOSPITAL VISIT    Lawrence Lieberman  1965  [id number]    Date of Consult: 8/19/2021    Admission Date: 8/17/2021      Requesting Provider: Brayan Ley MD  Evaluating Physician: Vic Brar MD    Reason for Consultation: Colovesicular fistula    History of present illness:    Patient is a 56 y.o. male with 3-month history of colovesicular fistula which began after memorial day patient has history of diverticulitis has been on antibiotics and is trying to cut back on tobacco use is gone down from using 2 packs/day down to 1 pack/day.  Patient taken for elective surgery patient was given dose of IV ertapenem prior to surgery is now on Zosyn patient has had intervention with a cystoscopy and bilateral stent placement today by urology followed by laparoscopic low anterior resection of colovesicular fistula with mobilization of splenic flexure and resection and culture of a pelvic abscess.  Patient had loop ileostomy and appendectomy today.    Patient is waking up from anesthesia reports the sensation need to urinate but has indwelling South catheter.  Patient is awake and alert family is by bedside patient denies fevers or chills.    8/18/2021 patient sitting up by the bedside is eating breakfast clear liquids more awake Today denies fevers rash sore throat diarrhea per wife patient is making some gas formation from loop ileostomy bag    8/19/2021 no events or night has had PICC line placed denies fevers rash or sore throat has stool output from ostomy tolerating p.o.    Past Medical History:   Diagnosis Date   • Diabetes mellitus (CMS/HCC)    • Diverticulitis    • GERD (gastroesophageal reflux disease)    • Hearing loss    • History of kidney stones    • Hypertension    • Kidney stone        Past Surgical History:   Procedure Laterality Date   • COLON RESECTION N/A 8/17/2021    Procedure: LAPAROSCOPIC ASSISTED  LOW ANTERIOR RESECTION, MOBILIZATION OF SPLENIC FLEXURE,  RESECTION OF COLOVESICAL FISTULA, APPENDECTOMY, LOOP IILEOSTOMY, PLACEMENT OF BILATERAL UROLOGIC CATHETERS;  Surgeon: Brayan Ley MD;  Location: [facility];  Service: General;  Laterality: N/A;   • COLONOSCOPY     • DENTAL PROCEDURE     • EYE SURGERY Right     eye socket surgery   • KIDNEY STONE SURGERY     • KNEE ARTHROSCOPY Bilateral        History reviewed. No pertinent family history.    Social History     Socioeconomic History   • Marital status: Single     Spouse name: Not on file   • Number of children: Not on file   • Years of education: Not on file   • Highest education level: Not on file   Tobacco Use   • Smoking status: Current Some Day Smoker     Packs/day: 1.00     Types: Cigarettes     Start date: 1984   • Smokeless tobacco: Never Used   • Tobacco comment: wearing a nicotine patch   Vaping Use   • Vaping Use: Never used   Substance and Sexual Activity   • Alcohol use: Never   • Drug use: Never   • Sexual activity: Defer       No Known Allergies      Medication:    Current Facility-Administered Medications:   •  acetaminophen (TYLENOL) tablet 650 mg, 650 mg, Oral, Q8H, Brayan Ley MD, 650 mg at 08/19/21 0950  •  allopurinol (ZYLOPRIM) tablet 100 mg, 100 mg, Oral, Daily, Gabi Feliz APRN, 100 mg at 08/19/21 0950  •  amLODIPine (NORVASC) tablet 5 mg, 5 mg, Oral, Daily, Gabi Feliz APRN, 5 mg at 08/19/21 0950  •  dextrose (D50W) 25 g/ 50mL Intravenous Solution 25 g, 25 g, Intravenous, Q15 Min PRN, Gabi Feliz APRN  •  dextrose (GLUTOSE) oral gel 15 g, 15 g, Oral, Q15 Min PRN, Gabi Feliz APRN  •  diazePAM (VALIUM) tablet 5 mg, 5 mg, Oral, Q6H PRN, Brayan Ley MD, 5 mg at 08/17/21 1305  •  famotidine (PEPCID) tablet 20 mg, 20 mg, Oral, BID, Brayan Ley MD, 20 mg at 08/19/21 0950  •  glucagon (human recombinant) (GLUCAGEN DIAGNOSTIC) injection 1 mg, 1 mg, Subcutaneous, Q15 Min PRN, Feliz, Gabi Lorenza, APRN  •  heparin (porcine) 5000 UNIT/ML  injection 5,000 Units, 5,000 Units, Subcutaneous, Q8H, Brayan Ley MD, 5,000 Units at 08/19/21 1452  •  HYDROcodone-acetaminophen (NORCO) 7.5-325 MG per tablet 1 tablet, 1 tablet, Oral, Q4H PRN, Brayan Ley MD, 1 tablet at 08/18/21 2320  •  HYDROmorphone (DILAUDID) injection 0.5 mg, 0.5 mg, Intravenous, Q2H PRN, 0.5 mg at 08/17/21 1632 **AND** naloxone (NARCAN) injection 0.4 mg, 0.4 mg, Intravenous, Q5 Min PRN, Brayan Ley MD  •  ibuprofen (ADVIL,MOTRIN) tablet 400 mg, 400 mg, Oral, Q6H PRN, Brayan Ley MD  •  insulin detemir (LEVEMIR) injection 10 Units, 10 Units, Subcutaneous, Nightly, Gabi Feliz APRN, 10 Units at 08/18/21 2051  •  insulin lispro (humaLOG) injection 0-7 Units, 0-7 Units, Subcutaneous, TID AC, Gabi Feliz APRN, 2 Units at 08/18/21 1840  •  labetalol (NORMODYNE,TRANDATE) injection 10 mg, 10 mg, Intravenous, Q4H PRN, Gabi Feliz APRN  •  lactated ringers infusion, 9 mL/hr, Intravenous, Continuous, Sulema Woods DO, Last Rate: 9 mL/hr at 08/17/21 0651, New Bag at 08/17/21 1047  •  micafungin 100 mg/100 mL 0.9% NS IVPB (mbp), 100 mg, Intravenous, Q24H, Vic Brar MD, 100 mg at 08/18/21 1600  •  nicotine (NICODERM CQ) 14 MG/24HR patch 1 patch, 1 patch, Transdermal, Daily, Gabi Feliz APRN, 1 patch at 08/19/21 0954  •  ondansetron (ZOFRAN) tablet 4 mg, 4 mg, Oral, Q6H PRN **OR** ondansetron (ZOFRAN) injection 4 mg, 4 mg, Intravenous, Q6H PRN, Brayan Ley MD, 4 mg at 08/17/21 1409  •  piperacillin-tazobactam (ZOSYN) 3.375 g in iso-osmotic dextrose 50 ml (premix), 3.375 g, Intravenous, Q8H, Brayan Ley MD, 3.375 g at 08/19/21 0950  •  scopolamine patch 1 mg/72 hr, 1 patch, Transdermal, Continuous, Brayan Ley MD, 1 patch at 08/17/21 0659  •  sodium chloride 0.9 % bolus 500 mL, 500 mL, Intravenous, TID PRN, Gabi Feliz, MAGDA  •  sodium chloride 0.9 % flush 10 mL, 10 mL, Intravenous, Q12H, Vic Brar  MD Irving  •  sodium chloride 0.9 % flush 10 mL, 10 mL, Intravenous, PRN, Vic Brar MD  •  sodium chloride 0.9 % with KCl 20 mEq/L infusion, 100 mL/hr, Intravenous, Continuous, Brayan Ley MD, Last Rate: 100 mL/hr at 21, 100 mL/hr at 21    Antibiotics:  Anti-Infectives (From admission, onward)    Ordered     Dose/Rate Route Frequency Start Stop    21 1159  piperacillin-tazobactam (ZOSYN) 3.375 g in iso-osmotic dextrose 50 ml (premix)     Florencio Montelongo, PharmD reviewed the order on 21.   Ordering Provider: Brayan Ley MD    3.375 g  over 4 Hours Intravenous Every 8 Hours 21 1800 21 1759    21 1601  micafungin 100 mg/100 mL 0.9% NS IVPB (mbp)     Florencio Montelongo, PharmD reviewed the order on 21.   Ordering Provider: Vic Brar MD    100 mg  over 60 Minutes Intravenous Every 24 Hours 21 1700 21 1659    21 0632  ertapenem (INVanz) 1 g/100 mL 0.9% NS VTB (mbp)     Ordering Provider: Brayan Ley MD    1 g  over 30 Minutes Intravenous Once 21 0634 21 0808            Review of Systems:    See HPI  Physical Exam:   Vital Signs  Temp (24hrs), Av °F (36.7 °C), Min:97.8 °F (36.6 °C), Max:98.2 °F (36.8 °C)    Temp  Min: 97.8 °F (36.6 °C)  Max: 98.2 °F (36.8 °C)  BP  Min: 124/69  Max: 133/84  Pulse  Min: 51  Max: 63  Resp  Min: 18  Max: 19  SpO2  Min: 92 %  Max: 93 %    GENERAL: Awake and alert, in no acute distress.  Age-appropriate  HEENT: Normocephalic, atraumatic.  PERRL. EOMI. No conjunctival injection. No icterus.  No external oral lesions    HEART: RRR; No murmur,   LUNGS: Clear to auscultation bilaterally   ABDOMEN: Soft, nontender, nondistended.   EXT:  No cyanosis, clubbing or edema.     MSK: No joint deformity  SKIN: Warm and dry without cutaneous eruptions on Inspection/palpation.    NEURO: Oriented to PPT.  PSYCHIATRIC: Normal insight and judgement. Cooperative with PE    Laboratory  Data    Results from last 7 days   Lab Units 08/19/21  0356 08/18/21  0418 08/16/21  1046   WBC 10*3/mm3 14.54* 15.53* 14.83*   HEMOGLOBIN g/dL 12.8* 14.1 17.0   HEMATOCRIT % 39.2 44.7 51.3*   PLATELETS 10*3/mm3 205 211 252     Results from last 7 days   Lab Units 08/19/21  0356   SODIUM mmol/L 138   POTASSIUM mmol/L 4.1   CHLORIDE mmol/L 106   CO2 mmol/L 23.0   BUN mg/dL 15   CREATININE mg/dL 0.68*   GLUCOSE mg/dL 112*   CALCIUM mg/dL 8.5*     Results from last 7 days   Lab Units 08/16/21  1046   ALK PHOS U/L 91   BILIRUBIN mg/dL 0.4   ALT (SGPT) U/L 10   AST (SGOT) U/L 20                         Estimated Creatinine Clearance: 163.4 mL/min (A) (by C-G formula based on SCr of 0.68 mg/dL (L)).      Microbiology:  No results found for: ACANTHNAEG, AFBCX, BPERTUSSISCX, BLOODCX  No results found for: BCIDPCR, CXREFLEX, CSFCX, CULTURETIS  No results found for: CULTURES, HSVCX, URCX  No results found for: EYECULTURE, GCCX, HSVCULTURE, LABHSV  No results found for: LEGIONELLA, MRSACX, MUMPSCX, MYCOPLASCX  No results found for: NOCARDIACX, STOOLCX  No results found for: THROATCX, UNSTIMCULT, URINECX, CULTURE, VZVCULTUR  No results found for: VIRALCULTU, WOUNDCX        Radiology:  Imaging Results (Last 72 Hours)     ** No results found for the last 72 hours. **            Impression:   Pelvic abscess  Colovesicular fistula  Status post laparoscopic low anterior resection of colovesicular fistula  Loop ileostomy  Tobacco abuse  Weight loss  Leukocytosis with neutrophilia  Status post cystectomy and bilateral stent placement  PLAN/RECOMMENDATIONS:   Thank you for asking us to see Lawrence Lieberman, I recommend the following:  Continue piperacillin/tazobactam cover for gram-negative rods and anaerobic process  Follow-up operative cultures Enterococcus species pending  Continue micafungin 100 mg IV now on daily    Order PICC line for tomorrow    Anticipate 2 to 3 weeks of IV antibiotics  Order PICC line    Discussed case with  family      Vic Brar MD  8/19/2021  16:03 EDT

## 2021-08-19 NOTE — PROGRESS NOTES
"IM progress note      Lawrence Lieberman  [id number]  1965     LOS: 2 days     Attending: Brayan Ley MD    Primary Care Provider: Chano Valverde MD      Chief Complaint/Reason for visit: Abdominal pain    Subjective   Doing well.  Reports abdominal soreness is better today.  Ostomy with dark brown liquid stool.  Anticipating bridge removal from the stoma and possible discharge tomorrow.  Denies nausea, shortness of breath or chest pain.  ( improving overall. Has stoma output)wy    Objective          Visit Vitals  /84 (BP Location: Left arm, Patient Position: Lying)   Pulse 51   Temp 98.1 °F (36.7 °C) (Oral)   Resp 19   Ht 182.9 cm (72\")   Wt 116 kg (256 lb)   SpO2 92%   BMI 34.72 kg/m²       Nutrition: ADAT    Respiratory: Room air    Physical Exam:     General Appearance:    Alert, cooperative, in no acute distress   Head:    Normocephalic, without obvious abnormality, atraumatic    Lungs:     Normal effort, symmetric chest rise, no crepitus, clear to      auscultation bilaterally             Heart:    Regular rhythm and normal rate, normal S1 and S2   Abdomen:     Surgical incision dressing CDI.  Ostomy in RLQ with red rubber catheter in appliance/not in stoma currently /wy, and ostomy pouch over stoma.  Stoma beefy red, dark brown stool drainage.  DOMINIC drain intact LUQ   Extremities:   No clubbing, cyanosis or edema.  No deformities.    Pulses:   Pulses palpable and equal bilaterally   Skin:   No bleeding, bruising or rash   Neurologic:   Moves all extremities with no obvious focal motor deficit.  Cranial nerves 2 - 12 grossly intact     Results Review:     I reviewed the patient's new clinical results.   Results from last 7 days   Lab Units 08/19/21  0356 08/18/21 0418 08/16/21  1046   WBC 10*3/mm3 14.54* 15.53* 14.83*   HEMOGLOBIN g/dL 12.8* 14.1 17.0   HEMATOCRIT % 39.2 44.7 51.3*   PLATELETS 10*3/mm3 205 211 252     Results from last 7 days   Lab Units 08/19/21  0356 08/18/21  0418 " 08/16/21  1046   SODIUM mmol/L 138 139 136   POTASSIUM mmol/L 4.1 4.7 3.7   CHLORIDE mmol/L 106 104 101   CO2 mmol/L 23.0 25.0 23.0   BUN mg/dL 15 10 8   CREATININE mg/dL 0.68* 0.69* 0.76   CALCIUM mg/dL 8.5* 9.2 9.5   BILIRUBIN mg/dL  --   --  0.4   ALK PHOS U/L  --   --  91   ALT (SGPT) U/L  --   --  10   AST (SGOT) U/L  --   --  20   GLUCOSE mg/dL 112* 172* 164*     Results for SOMMER CHOWDHURY CHA (MRN [id number]) as of 8/18/2021 15:24   Ref. Range 8/17/2021 20:27 8/18/2021 04:18 8/18/2021 07:57 8/18/2021 12:00   Glucose Latest Ref Range: 70 - 130 mg/dL 188 (H) 172 (H) 159 (H) 142 (H)     I reviewed the patient's new imaging including images and reports.    All medications reviewed.   acetaminophen, 650 mg, Oral, Q8H  allopurinol, 100 mg, Oral, Daily  amLODIPine, 5 mg, Oral, Daily  famotidine, 20 mg, Oral, BID  heparin (porcine), 5,000 Units, Subcutaneous, Q8H  insulin detemir, 10 Units, Subcutaneous, Nightly  insulin lispro, 0-7 Units, Subcutaneous, TID AC  micafungin (MYCAMINE) IV, 100 mg, Intravenous, Q24H  nicotine, 1 patch, Transdermal, Daily  piperacillin-tazobactam, 3.375 g, Intravenous, Q8H  sodium chloride, 10 mL, Intravenous, Q12H        Assessment/Plan     S/P LAR, appy, loop ileostomy, resection of colovesical fistula    DM2 (diabetes mellitus, type 2) (CMS/HCC)    Diverticulitis    Hypertension    Colovesical fistula    Acute postoperative pain    Leukocytosis      Plan  1. Ambulation/ encouraged, several times daily/wy  2. Pain control-prns   3. IS-encouraged  4. DVT proph- mechs/heparin  5. Bowel regimen  6. Diet, soft diet as tolerated.  7. Monitor post-op labs  8. DC planning for home     Post op Antibiotics per Infectious Disease  -PICC placed 8/18  -Continue micafungin and Zosyn     DM2  - hgb A1c on 8/16/2021 8.0  -Levemir 10 units nightly  - Accu-Chek AC and HS with low dose SSI     HTN, Hyperlipidemia  - Continue home Norvasc  - Monitor BP   - Holding parameters for BP meds  - Labetalol PRN  for SBP>170     CPAP at bedtime and as needed.MAGDA Laurent  08/19/21  10:10 EDT     I have personally performed the evaluation on this patient. My history is consistent  with HPI obtained. My exam findings are listed above. I have personally reviewed and discussed the above formulated treatment plan with pt and RN, and with MAGDA Patel.wy.

## 2021-08-19 NOTE — PLAN OF CARE
Goal Outcome Evaluation:  Plan of Care Reviewed With: patient, spouse        Progress: improving     Pts VSS.  Patient doing good.  Slept well tonight,  Pt with good output of urine tonight.  Pt having dark brown liquid stool in his ostomy.  Pt complained of soreness more tonight and was given pain medication for this.  Pt is in on acute distress.  Wilol monitor...Spouse remains at bedside.  Pts urine output was only 175 on day shift yesterday and on ly 300 tonight.  Bladder scanned patient and there was 0ml in bladder.  Pt denies fullness.Will pass on in report.

## 2021-08-19 NOTE — CASE MANAGEMENT/SOCIAL WORK
Continued Stay Note  [facility]     Patient Name: Lawrence Lieberman  MRN: [id number]  Today's Date: 8/19/2021    Admit Date: 8/17/2021    Discharge Plan     Row Name 08/19/21 0844       Plan    Plan  Home with SO    Patient/Family in Agreement with Plan  yes    Plan Comments  Spoke with patient at bedside. Plan is home with SO. Patient denies any discharge needs at this time. CM will continue to follow.    Final Discharge Disposition Code  01 - home or self-care        Discharge Codes    No documentation.             Ten Mota RN

## 2021-08-20 VITALS
HEART RATE: 59 BPM | BODY MASS INDEX: 34.67 KG/M2 | HEIGHT: 72 IN | WEIGHT: 256 LBS | OXYGEN SATURATION: 95 % | SYSTOLIC BLOOD PRESSURE: 130 MMHG | RESPIRATION RATE: 18 BRPM | TEMPERATURE: 98.3 F | DIASTOLIC BLOOD PRESSURE: 80 MMHG

## 2021-08-20 LAB
BACTERIA SPEC AEROBE CULT: ABNORMAL
BASOPHILS # BLD AUTO: 0.07 10*3/MM3 (ref 0–0.2)
BASOPHILS NFR BLD AUTO: 0.6 % (ref 0–1.5)
DEPRECATED RDW RBC AUTO: 45 FL (ref 37–54)
EOSINOPHIL # BLD AUTO: 0.23 10*3/MM3 (ref 0–0.4)
EOSINOPHIL NFR BLD AUTO: 2 % (ref 0.3–6.2)
ERYTHROCYTE [DISTWIDTH] IN BLOOD BY AUTOMATED COUNT: 13.2 % (ref 12.3–15.4)
GLUCOSE BLDC GLUCOMTR-MCNC: 122 MG/DL (ref 70–130)
GLUCOSE BLDC GLUCOMTR-MCNC: 94 MG/DL (ref 70–130)
GRAM STN SPEC: ABNORMAL
GRAM STN SPEC: ABNORMAL
HCT VFR BLD AUTO: 42.9 % (ref 37.5–51)
HGB BLD-MCNC: 14 G/DL (ref 13–17.7)
IMM GRANULOCYTES # BLD AUTO: 0.04 10*3/MM3 (ref 0–0.05)
IMM GRANULOCYTES NFR BLD AUTO: 0.3 % (ref 0–0.5)
LYMPHOCYTES # BLD AUTO: 2.58 10*3/MM3 (ref 0.7–3.1)
LYMPHOCYTES NFR BLD AUTO: 22.2 % (ref 19.6–45.3)
MCH RBC QN AUTO: 29.7 PG (ref 26.6–33)
MCHC RBC AUTO-ENTMCNC: 32.6 G/DL (ref 31.5–35.7)
MCV RBC AUTO: 91.1 FL (ref 79–97)
MONOCYTES # BLD AUTO: 1.05 10*3/MM3 (ref 0.1–0.9)
MONOCYTES NFR BLD AUTO: 9 % (ref 5–12)
NEUTROPHILS NFR BLD AUTO: 65.9 % (ref 42.7–76)
NEUTROPHILS NFR BLD AUTO: 7.67 10*3/MM3 (ref 1.7–7)
NRBC BLD AUTO-RTO: 0 /100 WBC (ref 0–0.2)
PLATELET # BLD AUTO: 202 10*3/MM3 (ref 140–450)
PMV BLD AUTO: 10.7 FL (ref 6–12)
RBC # BLD AUTO: 4.71 10*6/MM3 (ref 4.14–5.8)
WBC # BLD AUTO: 11.64 10*3/MM3 (ref 3.4–10.8)

## 2021-08-20 PROCEDURE — 25010000003 AMPICILLIN-SULBACTAM PER 1.5 G: Performed by: INTERNAL MEDICINE

## 2021-08-20 PROCEDURE — 25010000002 SODIUM CHLORIDE 0.9 % WITH KCL 20 MEQ 20-0.9 MEQ/L-% SOLUTION: Performed by: COLON & RECTAL SURGERY

## 2021-08-20 PROCEDURE — 25010000002 PIPERACILLIN SOD-TAZOBACTAM PER 1 G: Performed by: COLON & RECTAL SURGERY

## 2021-08-20 PROCEDURE — 85025 COMPLETE CBC W/AUTO DIFF WBC: CPT | Performed by: COLON & RECTAL SURGERY

## 2021-08-20 PROCEDURE — 25010000002 HEPARIN (PORCINE) PER 1000 UNITS: Performed by: COLON & RECTAL SURGERY

## 2021-08-20 PROCEDURE — 82962 GLUCOSE BLOOD TEST: CPT

## 2021-08-20 RX ORDER — HYDROCODONE BITARTRATE AND ACETAMINOPHEN 7.5; 325 MG/1; MG/1
1 TABLET ORAL EVERY 4 HOURS PRN
Qty: 40 TABLET | Refills: 0 | Status: SHIPPED | OUTPATIENT
Start: 2021-08-20

## 2021-08-20 RX ADMIN — DIAZEPAM 5 MG: 5 TABLET ORAL at 03:07

## 2021-08-20 RX ADMIN — POTASSIUM CHLORIDE AND SODIUM CHLORIDE 100 ML/HR: 900; 150 INJECTION, SOLUTION INTRAVENOUS at 11:00

## 2021-08-20 RX ADMIN — ACETAMINOPHEN 650 MG: 325 TABLET, FILM COATED ORAL at 07:29

## 2021-08-20 RX ADMIN — TAZOBACTAM SODIUM AND PIPERACILLIN SODIUM 3.38 G: 375; 3 INJECTION, SOLUTION INTRAVENOUS at 01:15

## 2021-08-20 RX ADMIN — FAMOTIDINE 20 MG: 20 TABLET ORAL at 07:29

## 2021-08-20 RX ADMIN — NICOTINE 1 PATCH: 14 PATCH, EXTENDED RELEASE TRANSDERMAL at 07:31

## 2021-08-20 RX ADMIN — HEPARIN SODIUM 5000 UNITS: 5000 INJECTION, SOLUTION INTRAVENOUS; SUBCUTANEOUS at 05:56

## 2021-08-20 RX ADMIN — ALLOPURINOL 100 MG: 100 TABLET ORAL at 07:30

## 2021-08-20 RX ADMIN — SODIUM CHLORIDE, PRESERVATIVE FREE 10 ML: 5 INJECTION INTRAVENOUS at 07:32

## 2021-08-20 RX ADMIN — AMPICILLIN SODIUM AND SULBACTAM SODIUM 3 G: 2; 1 INJECTION, POWDER, FOR SOLUTION INTRAMUSCULAR; INTRAVENOUS at 11:00

## 2021-08-20 RX ADMIN — ACETAMINOPHEN 650 MG: 325 TABLET, FILM COATED ORAL at 01:15

## 2021-08-20 RX ADMIN — AMLODIPINE BESYLATE 5 MG: 5 TABLET ORAL at 07:30

## 2021-08-20 NOTE — CASE MANAGEMENT/SOCIAL WORK
Continued Stay Note  [facility]     Patient Name: Lawrence Lieberman  MRN: [id number]  Today's Date: 8/20/2021    Admit Date: 8/17/2021    Discharge Plan     Row Name 08/20/21 1408       Plan    Plan Comments  Spoke with Alicia at [facility] and they are unable to accept the patient. Spoke with Sulema at [facility] and she will run it and get the cost. CM will continue to follow.    Row Name 08/20/21 1332       Plan    Plan  Home with [facility]    Patient/Family in Agreement with Plan  yes    Final Discharge Disposition Code  06 - home with home health care    Final Note  Plan is home with [facility]. [facility] will transport. [facility] will deliver IV antibiotics to the patient's room. Chantel at [facility] has accepted the patient.    Row Name 08/20/21 1210       Plan    Plan  Home with [facility]    Patient/Family in Agreement with Plan  yes    Plan Comments  Spoke with chantel at [facility] and faxed referral. Plan will be home with [facility]. CM will continue to follow.    Final Discharge Disposition Code  06 - home with home health care        Discharge Codes    No documentation.       Expected Discharge Date and Time     Expected Discharge Date Expected Discharge Time    Aug 20, 2021             Ten Mota RN

## 2021-08-20 NOTE — CASE MANAGEMENT/SOCIAL WORK
Case Management Discharge Note      Final Note: Plan is home with [facility]. [facility] will transport. [facility] will deliver IV antibiotics to the patient's room. Chantel at [facility] has accepted the patient.         Selected Continued Care - Admitted Since 8/17/2021     Destination    No services have been selected for the patient.              Durable Medical Equipment    No services have been selected for the patient.              Dialysis/Infusion Coordination complete.    Service Provider Selected Services Address Phone Fax Patient Preferred    [facility]  Infusion and IV Therapy [address], [facility] [address] [phone] --          Home Medical Care Coordination complete.    Service Provider Selected Services Address Phone Fax Patient Preferred    [facility]  Home Health Services [address] [address] [phone] --          Therapy    No services have been selected for the patient.              Community Resources    No services have been selected for the patient.              Community & DME    No services have been selected for the patient.                       Final Discharge Disposition Code: 06 - home with home health care

## 2021-08-20 NOTE — NURSING NOTE
Follow up education with patient's wife. She did the measuring, patient did the cutting and pouching do's and don'ts reviewed. All patient questions answered. wocn encouraged patient to reach out for assistance when pouching needs change.

## 2021-08-20 NOTE — NURSING NOTE
Wocn follow up for ostomy education    Surgery date: 08/17/2021  Ostomy type: loop ileostomy    Stoma size & Ax: 29 mm x 38 mm almost flush with skin, bridge removed per MD, mc junction intact, peristomal skin cdi, small crease to lateral aspect of stoma. 80 mL of dark brown stool emptied from pouch.    Appliance in place: kinga [id number]  Last pouch change: 08/20/2021    Education performed: diet, hydration and electrolytes reviewed. Patient asking appropriate questions. Protein stressed. Pouch changing procedure and schedule reviewed. Would like to review with wife at time of discharge. Would also like wife to measure stoma and practice cutting a pouch prior to DC so I gave patient my extension to call me when she gets here.  DC supplies given. Crusting reviewed with patient and paste reviewed with patient. Catalogue reviewed with patient and pages marked.     Wocn will continue to follow. Please contact with questions or concerns.

## 2021-08-20 NOTE — PLAN OF CARE
Problem: Adult Inpatient Plan of Care  Goal: Plan of Care Review  Outcome: Ongoing, Progressing  Goal: Patient-Specific Goal (Individualized)  Outcome: Ongoing, Progressing  Goal: Absence of Hospital-Acquired Illness or Injury  Outcome: Ongoing, Progressing  Intervention: Identify and Manage Fall Risk  Recent Flowsheet Documentation  Taken 8/20/2021 1200 by Dalia Jaime RN  Safety Promotion/Fall Prevention:   activity supervised   assistive device/personal items within reach   clutter free environment maintained   fall prevention program maintained   nonskid shoes/slippers when out of bed   safety round/check completed  Taken 8/20/2021 1000 by Dalia Jaime RN  Safety Promotion/Fall Prevention:   activity supervised   assistive device/personal items within reach   clutter free environment maintained   fall prevention program maintained   nonskid shoes/slippers when out of bed   safety round/check completed  Taken 8/20/2021 0800 by Dalia Jaime RN  Safety Promotion/Fall Prevention:   activity supervised   assistive device/personal items within reach   clutter free environment maintained   fall prevention program maintained   nonskid shoes/slippers when out of bed   safety round/check completed  Intervention: Prevent Skin Injury  Recent Flowsheet Documentation  Taken 8/20/2021 1200 by Dalia Jaime RN  Body Position: position changed independently  Skin Protection:   adhesive use limited   incontinence pads utilized   tubing/devices free from skin contact  Taken 8/20/2021 1000 by Dalia Jaime RN  Body Position: position changed independently  Skin Protection:   adhesive use limited   incontinence pads utilized   tubing/devices free from skin contact  Taken 8/20/2021 0800 by Dalia Jaime RN  Body Position: position changed independently  Skin Protection:   adhesive use limited   incontinence pads utilized   tubing/devices free from skin contact  Intervention: Prevent and Manage VTE (venous thromboembolism)  Risk  Recent Flowsheet Documentation  Taken 8/20/2021 0800 by Dalia Jaime RN  VTE Prevention/Management:   bilateral   dorsiflexion/plantar flexion performed   bleeding risk factor(s) identified  Intervention: Prevent Infection  Recent Flowsheet Documentation  Taken 8/20/2021 1200 by Dalia Jaime RN  Infection Prevention: environmental surveillance performed  Taken 8/20/2021 0800 by Dalia Jaime RN  Infection Prevention: environmental surveillance performed  Goal: Optimal Comfort and Wellbeing  Outcome: Ongoing, Progressing  Intervention: Provide Person-Centered Care  Recent Flowsheet Documentation  Taken 8/20/2021 0800 by Dalia Jaime RN  Trust Relationship/Rapport:   care explained   choices provided   thoughts/feelings acknowledged  Goal: Readiness for Transition of Care  Outcome: Ongoing, Progressing     Problem: Diabetes Comorbidity  Goal: Blood Glucose Level Within Desired Range  Outcome: Ongoing, Progressing  Intervention: Maintain Glycemic Control  Recent Flowsheet Documentation  Taken 8/20/2021 0800 by Dalia Jaime RN  Glycemic Management: blood glucose monitoring     Problem: Hypertension Comorbidity  Goal: Blood Pressure in Desired Range  Outcome: Ongoing, Progressing  Intervention: Maintain Hypertension-Management Strategies  Recent Flowsheet Documentation  Taken 8/20/2021 1200 by Dalia Jaime RN  Medication Review/Management: medications reviewed  Taken 8/20/2021 1000 by Dalia Jaime RN  Medication Review/Management: medications reviewed  Taken 8/20/2021 0800 by Dalia Jaime RN  Medication Review/Management: medications reviewed     Problem: Obstructive Sleep Apnea Risk or Actual (Comorbidity Management)  Goal: Unobstructed Breathing During Sleep  Outcome: Ongoing, Progressing   Goal Outcome Evaluation:

## 2021-08-20 NOTE — DISCHARGE PLACEMENT REQUEST
"Sommer Chowdhury Cha (56 y.o. Male)   Kit Mota, RN Case Manager  [phone]    Date of Birth Social Security Number Address Home Phone MRN    1965  [address] [place], [place] [id number] [phone] [id number]    Tenriism Marital Status          None Single       Admission Date Admission Type Admitting Provider Attending Provider Department, Room/Bed    21 Elective Brayan Ley MD Belin, Bruce M, MD [facility] 5G, S563/1    Discharge Date Discharge Disposition Discharge Destination                       Attending Provider: Brayan Ley MD    Allergies: No Known Allergies    Isolation: None   Infection: None   Code Status: CPR    Ht: 182.9 cm (72\")   Wt: 116 kg (256 lb)    Admission Cmt: None   Principal Problem: S/P LAR, appy, loop ileostomy [Z90.49] More...                 Active Insurance as of 2021     Primary Coverage     Payor Plan Insurance Group Employer/Plan Group    ANTHEM BLUE CROSS ANTHEM BLUE CROSS BLUE [facility] [id number]     Payor Plan Address Payor Plan Phone Number Payor Plan Fax Number Effective Dates    [address] [phone]  2021 - None Entered    [facility] [id number]       Subscriber Name Subscriber Birth Date Member ID       SOMMER CHOWDHURY CHA 1965 [id number]                 Emergency Contacts      (Rel.) Home Phone Work Phone Mobile Phone    SentTamia cerrato (Significant Other) -- -- [phone]        [address]  [address] [facility] [address]  Phone:  [phone]  Fax:  [phone]        Patient:     Sommer Chowdhury MRN:  [id number]   [address] [facility] [address] :  1965  SSN:    Phone: [phone] Sex:  M      INSURANCE PAYOR PLAN GROUP # SUBSCRIBER ID   Primary:    BETO BAKER [id number]   Admitting Diagnosis: Colovesical fistula [N32.1]  Order Date:  Aug 20, 2021         Inpatient Case Management  Consult       " (Order ID: [id number])     Diagnosis:         Priority:  Routine Expected Date:   Expiration Date:        Interval:   Count:    Comments: 1)unasyn 3 g iv everey 8 hours x 2 weeks  1.5) micafungin 100 mg Iv daily x 2 weeks  2)weekly picc care  3)check cbc, cmp, esr,  4)fax ID note to [facility] [id number]  5)schedule f/u with me this monday  Reason for Consult? [facility] to offer opat infusions     Specimen Type:   Specimen Source:   Specimen Taken Date:   Specimen Taken Time:                   Authorizing Provider:Vic Brar MD  Authorizing Provider's NPI: [id number]  Order Entered By: Vic Brar MD 8/20/2021  9:23 AM     Electronically signed by: Vic Brar MD 8/20/2021  9:23 AM

## 2021-08-20 NOTE — CASE MANAGEMENT/SOCIAL WORK
Case Management Discharge Note      Final Note: Plan is home with [facility]. [facility] will transport. [facility] will deliver patient's home IV antibiotics to the patient's room and do teaching. Sulema with Home [facility] will call  with cost.         Selected Continued Care - Admitted Since 8/17/2021     Destination    No services have been selected for the patient.              Durable Medical Equipment    No services have been selected for the patient.              Dialysis/Infusion Coordination complete.    Service Provider Selected Services Address Phone Fax Patient Preferred    [facility]  Infusion and IV Therapy [address] [facility] [address] [phone] --          Home Medical Care Coordination complete.    Service Provider Selected Services Address Phone Fax Patient Preferred    [facility] [address] [facility] [address] [phone] --          Therapy    No services have been selected for the patient.              [facility]    No services have been selected for the patient.              [facility]    No services have been selected for the patient.                       Final Discharge Disposition Code: 06 - home with home health care

## 2021-08-20 NOTE — CASE MANAGEMENT/SOCIAL WORK
Continued Stay Note  [facility]     Patient Name: Lawrence Lieberman  MRN: [id number]  Today's Date: 8/20/2021    Admit Date: 8/17/2021    Discharge Plan     Row Name 08/20/21 0849       Plan    Plan  Home with family    Patient/Family in Agreement with Plan  yes    Plan Comments  Spoke to patient at bedside. Plan is home with SO. Denies any discharge needs at this time. CM will continue to follow.    Final Discharge Disposition Code  01 - home or self-care        Discharge Codes    No documentation.             Ten Mota RN

## 2021-08-20 NOTE — DISCHARGE PLACEMENT REQUEST
"Sommer Chowdhury Cha (56 y.o. Male)     Date of Birth Social Security Number Address Home Phone MRN    1965  [address] [id number] [phone] [id number]    Tenriism Marital Status          None Single       Admission Date Admission Type Admitting Provider Attending Provider Department, Room/Bed    21 Elective Brayan Ley MD Belin, Bruce M, MD [facility] 5G, S563/1    Discharge Date Discharge Disposition Discharge Destination         Home or Self Care              Attending Provider: Brayan Ley MD    Allergies: No Known Allergies    Isolation: None   Infection: None   Code Status: CPR    Ht: 182.9 cm (72\")   Wt: 116 kg (256 lb)    Admission Cmt: None   Principal Problem: S/P LAR, appy, loop ileostomy [Z90.49] More...                 Active Insurance as of 2021     Primary Coverage     Payor Plan Insurance Group Employer/Plan Group    [employer] [facility]UE [facility] [id number]     Payor Plan Address Payor Plan Phone Number Payor Plan Fax Number Effective Dates    [address] [phone]  2021 - None Entered    [facility] [id number]       Subscriber Name Subscriber Birth Date Member ID       SOMMER CHOWDHURY CHA 1965 [id number]                 Emergency Contacts      (Rel.) Home Phone Work Phone Mobile Phone    Tamia Malik (Significant Other) -- -- [phone]        [address]  [address] [facility] [address]  Phone:  [phone]  Fax:  [phone] Date: Aug 20, 2021      Ambulatory Referral to Home Health     Patient:  Sommer Chowdhury MRN:  [id number]   [address] [facility] [address] :  1965  SSN:    Phone: [phone] Sex:  M      INSURANCE PAYOR PLAN GROUP # SUBSCRIBER ID   Primary:    BETO BAKER [id number]      Referring Provider Information:  JUAN CALIX Phone: [phone] Fax: [phone]      Referral " Information:   # Visits:  999 Referral Type: Home Health [42]   Urgency:  Routine Referral Reason: Specialty Services Required   Start Date: Aug 20, 2021 End Date:  To be determined by Insurer   Diagnosis: S/P colon resection (Z90.49 [ICD-10-CM] V45.89 [ICD-9-CM])  Leukocytosis, unspecified type (D72.829 [ICD-10-CM] 288.60 [ICD-9-CM])      Refer to Dept:   Refer to Provider:   Refer to Facility:       Face to Face Visit Date: 2021  Follow-up provider for Plan of Care? I will be treating the patient on an ongoing basis.  Please send me the Plan of Care for signature.  Follow-up provider: PATITO COHEN [[id number]]  Reason/Clinical Findings: S/P LAR  Describe mobility limitations that make leaving home difficult: Impaired mobility  Nursing/Therapeutic Services Requested: Skilled Nursing  Skilled nursing orders: PICC line care/instruction  Skilled nursing orders: Infusion therapy  Frequency: 1 Week 1     This document serves as a request of services and does not constitute Insurance authorization or approval of services.  To determine eligibility, please contact the members Insurance carrier to verify and review coverage.     If you have medical questions regarding this request for services. Please contact [address] at [phone] during normal business hours.       Authorizing Provider:Kaitlin Singh MD  Authorizing Provider's NPI: [id number]  Order Entered By: Ten Mota RN 2021 12:05 PM     Electronically signed by: Kaitlin Singh MD 2021 12:05 PM               History & Physical      Kaitlin Singh MD at 21 1039          Admission HP     Patient Name: Lawrence Lieberman  MRN: [id number]  : 1965  DOS: 2021    Attending: Brayan Ley MD    Primary Care Provider: Chano Valverde MD      Patient Care Team:  Chano Valverde MD as PCP - General (Family Medicine)    Chief complaint: Abdominal pain    Subjective   Patient is a  pleasant 56 y.o. male presented for scheduled surgery by Dr. Ley with assistance from Dr. Rosaline Keys.  He underwent LAPAROSCOPIC LOW ANTERIOR, anastomosis at 5 cm/below the peritoneal reflection and inflammation RESECTION OF COLOVESICAL FISTULA MOBILIZATION OF SPLENIC FLEXURE RESECTION AND CULTURE OF PELVIC ABSCESS APPENDECTOMY LOOP ILEOSTOMY by Dr. Ley and placement of bilateral urologic catheters by Dr. Rosaline Keys.  Surgery went well and he was admitted for further medical management.    When seen in PACU, patient is having significant incisional pain, 10 out of 10 and appears very anxious.  Nurse reports he was very anxious thrashing around in the bed when he came to them from the OR.  He has been given IV pain medicine with little relief.  IV Valium ordered for anxiety.  He is unable to give a thorough history at this time.    (Above is noted, agree.  Seen in his room afterwards, still complaining of pain though he is quite drowsy and family noted intermittently his oxygen saturation dropping some, he is requiring 4 L of oxygen via nasal cannula currently.)wy    Allergies:  No Known Allergies       Medications Prior to Admission   Medication Sig Dispense Refill Last Dose   • allopurinol (ZYLOPRIM) 100 MG tablet Take 100 mg by mouth Daily.   8/17/2021 at 0400   • amLODIPine (NORVASC) 5 MG tablet Take 5 mg by mouth Daily.   8/17/2021 at 0400   • empagliflozin (Jardiance) 10 MG tablet tablet Take 10 mg by mouth Daily.   8/16/2021 at 0700   • levoFLOXacin (LEVAQUIN) 750 MG tablet Take 750 mg by mouth Daily.   8/17/2021 at 0400   • metroNIDAZOLE (FLAGYL) 500 MG tablet Take 500 mg by mouth 3 (Three) Times a Day.   8/17/2021 at 0400   • nicotine (NICODERM CQ) 14 MG/24HR patch Place 1 patch on the skin as directed by provider Daily.   8/17/2021 at 0400   • SITagliptin (JANUVIA) 100 MG tablet Take 100 mg by mouth Daily.   8/16/2021 at 0700       Past Medical History:   Diagnosis Date   • Diabetes mellitus  "(CMS/[facility])    • Diverticulitis    • GERD (gastroesophageal reflux disease)    • Hearing loss    • History of kidney stones    • Hypertension    • Kidney stone      Past Surgical History:   Procedure Laterality Date   • COLONOSCOPY     • DENTAL PROCEDURE     • EYE SURGERY Right     eye socket surgery   • KIDNEY STONE SURGERY     • KNEE ARTHROSCOPY Bilateral      History reviewed. No pertinent family history.  Social History     Tobacco Use   • Smoking status: Current Some Day Smoker     Packs/day: 1.00     Types: Cigarettes     Start date: 1984   • Smokeless tobacco: Never Used   • Tobacco comment: wearing a nicotine patch   Vaping Use   • Vaping Use: Never used   Substance Use Topics   • Alcohol use: Never   • Drug use: Never       Review of Systems  Pertinent items are noted in HPI, all other systems reviewed and negative    Vital Signs  /75 (BP Location: Right arm, Patient Position: Lying)   Pulse 102   Temp 97.6 °F (36.4 °C) (Temporal)   Resp 20   Ht 182.9 cm (72\")   Wt 116 kg (256 lb)   SpO2 93%   BMI 34.72 kg/m²     Physical Exam:    General Appearance:    Alert, cooperative, in no acute distress   Head:    Normocephalic, without obvious abnormality, atraumatic   Eyes:            Lids and lashes normal, conjunctivae and sclerae normal, no   icterus, no pallor, corneas clear   Ears:    Ears appear intact with no abnormalities noted   Throat:   No oral lesions, no thrush, oral mucosa moist   Neck:   No adenopathy, supple, trachea midline, no thyromegaly         Lungs:     Clear to auscultation,respirations regular, even and       unlabored. No wheezes or rales.    Heart:    Regular rhythm and normal rate, normal S1 and S2, no murmur    Abdomen:    Surgical incision dressing CDI.  Ostomy in RLQ with red rubber catheter in place, and ostomy pouch over stoma.  Stoma beefy red, small sanguinous drainage.  DOMINIC drain intact LUQ   Genitalia:    Deferred  South catheter in place   Extremities:   Moves all " extremities well, no edema, no cyanosis, no              redness   Pulses:   Pulses palpable and equal bilaterally   Skin:   No bleeding, bruising or rash   Neurologic:   Cranial nerves 2 - 12 grossly intact     Results from last 7 days   Lab Units 08/16/21  1046   WBC 10*3/mm3 14.83*   HEMOGLOBIN g/dL 17.0   HEMATOCRIT % 51.3*   PLATELETS 10*3/mm3 252     Results from last 7 days   Lab Units 08/16/21  1046   SODIUM mmol/L 136   POTASSIUM mmol/L 3.7   CHLORIDE mmol/L 101   CO2 mmol/L 23.0   BUN mg/dL 8   CREATININE mg/dL 0.76   CALCIUM mg/dL 9.5   BILIRUBIN mg/dL 0.4   ALK PHOS U/L 91   ALT (SGPT) U/L 10   AST (SGOT) U/L 20   GLUCOSE mg/dL 164*     Lab Results   Component Value Date    HGBA1C 8.00 (H) 08/16/2021       Assessment and Plan:       S/P LAPAROSCOPIC LOW ANTERIOR, RESECTION OF COLOVESICAL FISTULA MOBILIZATION OF SPLENIC FLEXURE RESECTION    DM2 (diabetes mellitus, type 2) (CMS/HCC)    Diverticulitis    Hypertension    Colovesical fistula  JERRY history    Plan  1. Ambulation, encouraged, several times daily.wy  2. Pain control-prns   3. IS-encourage  4. DVT proph- Mechanical, subcutaneous heparin  5. Bowel regimen  6. Resume home medications as appropriate  7. Monitor post-op labs  8. DC planning   9. Diet, Clears, advance diet as tolerated.  IVF initially, monitor volume status.    Post op Antibiotics per Infectious Disease    DM2  - hgb A1c on 8/16/2021 8.0  -Levemir 10 units nightly  - Accu-Chek AC and HS with low dose SSI    HTN, Hyperlipidemia  - Continue home Norvasc  - Monitor BP   - Holding parameters for BP meds  - Labetalol PRN for SBP>170    CPAP at at bedtime and as needed.lexx Dowd disclaimer:  Part of this encounter note is an electronic transcription/translation of spoken language to printed text. The electronic translation of spoken language may permit erroneous, or at times, nonsensical words or phrases to be inadvertently transcribed; Although I have reviewed the note for such errors,  "some may still exist.    MAGDA Garcia  08/17/21  12:48 EDT    I have personally performed the evaluation on this patient. My history is consistent  with HPI obtained. My exam findings are listed above. I have personally reviewed and discussed the above formulated treatment plan with patient and family and with Gabi MAN.wy            Electronically signed by Kaitlin Singh MD at 08/17/21 2344     Jean Herrera PA at 08/17/21 0648     Attestation signed by Brayan Ley MD at 08/17/21 0808    No change in health    Loop ileostomy likely                  [employer]   Physician      H&P      Signed   Date of Service:  08/02/21 0000   Creation Time:  08/16/21 1058            Associated Documents  Scan on 8/2/2021 by [employer]: KIYA PITTMAN, 08/02/2021            Last signed by: [employer] at 08/16/21 1058     [facility] Pre-op    Full history and physical note from office is up to date.     /82 (BP Location: Right arm, Patient Position: Lying)   Pulse 107   Temp 98 °F (36.7 °C) (Temporal)   Resp 18   Ht 182.9 cm (72\")   Wt 116 kg (256 lb)   SpO2 95%   BMI 34.72 kg/m²   Patient denies allergy to contrast dye or latex  IMM:  Influenza: No  Pneumococcal: No  Tetanus: Up-to-date  Lungs: Increased AP diameter noted, scattered rhonchi throughout.  No wheezing.  Cardiovascular: S1-S2 without rubs murmurs or gallops  Abdomen: Soft, nontender, bowel sounds present throughout.    LAB Results:  Lab Results   Component Value Date    WBC 14.83 (H) 08/16/2021    HGB 17.0 08/16/2021    HCT 51.3 (H) 08/16/2021    MCV 89.4 08/16/2021     08/16/2021    GLUCOSE 164 (H) 08/16/2021    BUN 8 08/16/2021    CREATININE 0.76 08/16/2021    EGFRIFNONA 107 08/16/2021     08/16/2021    K 3.7 08/16/2021     08/16/2021    CO2 23.0 08/16/2021    CALCIUM 9.5 08/16/2021    ALBUMIN 4.00 08/16/2021    AST 20 08/16/2021    ALT 10 08/16/2021    BILITOT 0.4 " 08/16/2021       Cancer Staging (if applicable)  Cancer Patient: __ yes __no __unknown__N/A; If yes, clinical stage T:__ N:__M:__, stage group or __N/A  Impression: Colovesical fistula  Leukocytosis  Obesity  Recent weight loss  Tobacco abuse, ongoing  Probable COPD  Gout  Non-insulin-requiring diabetes mellitus  Plan: Laparoscopic/likely open low anterior resection, resection of fistula, possible stoma, bilateral urologic catheters.    LEONCIO Zaman 8/17/2021 06:50 EDT    Electronically signed by Brayan Ley MD at 08/17/21 0808     H&P signed by [employer] at 08/16/21 1058      Scan on 8/2/2021 by [employer]: H&amp;P, [facility], 08/02/2021          Electronically signed by [employer] at 08/16/21 1058       Vital Signs (last day)     Date/Time   Temp   Temp src   Pulse   Resp   BP   Patient Position   SpO2    08/20/21 0733   98.3 (36.8)   Oral   59   18   130/80   Lying   --    08/19/21 1900   --   --   67   18   148/83   Lying   95    08/19/21 0712   98.1 (36.7)   Oral   51   19   133/84   Lying   92    08/19/21 0500   --   --   --   --   --   --   93    08/19/21 0352   97.8 (36.6)   Oral   56   18   125/78   Lying   93                Current Facility-Administered Medications   Medication Dose Route Frequency Provider Last Rate Last Admin   • acetaminophen (TYLENOL) tablet 650 mg  650 mg Oral Q8H Brayan Ley MD   650 mg at 08/20/21 0729   • allopurinol (ZYLOPRIM) tablet 100 mg  100 mg Oral Daily Gabi Feliz APRN   100 mg at 08/20/21 0730   • amLODIPine (NORVASC) tablet 5 mg  5 mg Oral Daily Gabi Feliz APRN   5 mg at 08/20/21 0730   • ampicillin-sulbactam 3 g/100 mL 0.9% NS (MBP)  3 g Intravenous Q8H Vic Brar MD   3 g at 08/20/21 1100   • dextrose (D50W) 25 g/ 50mL Intravenous Solution 25 g  25 g Intravenous Q15 Min PRN Gabi Feliz, MAGDA       • dextrose (GLUTOSE) oral gel 15 g  15 g Oral Q15 Min PRN Gabi Feliz, APRN        • diazePAM (VALIUM) tablet 5 mg  5 mg Oral Q6H PRN Brayan Ley MD   5 mg at 08/20/21 0307   • famotidine (PEPCID) tablet 20 mg  20 mg Oral BID Brayan Ley MD   20 mg at 08/20/21 0729   • glucagon (human recombinant) (GLUCAGEN DIAGNOSTIC) injection 1 mg  1 mg Subcutaneous Q15 Min PRN Gabi Feliz APRN       • heparin (porcine) 5000 UNIT/ML injection 5,000 Units  5,000 Units Subcutaneous Q8H Brayan Ley MD   5,000 Units at 08/20/21 0556   • HYDROcodone-acetaminophen (NORCO) 7.5-325 MG per tablet 1 tablet  1 tablet Oral Q4H PRN Brayan Ley MD   1 tablet at 08/19/21 2226   • HYDROmorphone (DILAUDID) injection 0.5 mg  0.5 mg Intravenous Q2H PRN Brayan Ley MD   0.5 mg at 08/17/21 1632    And   • naloxone (NARCAN) injection 0.4 mg  0.4 mg Intravenous Q5 Min PRN Brayan Ley MD       • ibuprofen (ADVIL,MOTRIN) tablet 400 mg  400 mg Oral Q6H PRN Brayan Ley MD       • insulin detemir (LEVEMIR) injection 10 Units  10 Units Subcutaneous Nightly Gabi Feliz APRN   10 Units at 08/19/21 2030   • insulin lispro (humaLOG) injection 0-7 Units  0-7 Units Subcutaneous TID AC Gabi Feliz APRN   2 Units at 08/19/21 1808   • labetalol (NORMODYNE,TRANDATE) injection 10 mg  10 mg Intravenous Q4H PRN Gabi Feliz APRN       • lactated ringers infusion  9 mL/hr Intravenous Continuous Sulema Woods DO 9 mL/hr at 08/17/21 0651 New Bag at 08/17/21 1047   • micafungin 100 mg/100 mL 0.9% NS IVPB (mbp)  100 mg Intravenous Q24H Vic Brar MD   100 mg at 08/19/21 1807   • nicotine (NICODERM CQ) 14 MG/24HR patch 1 patch  1 patch Transdermal Daily Gabi Feliz, APRN   1 patch at 08/20/21 0731   • ondansetron (ZOFRAN) tablet 4 mg  4 mg Oral Q6H PRN Brayan Ley MD        Or   • ondansetron (ZOFRAN) injection 4 mg  4 mg Intravenous Q6H PRN Brayan Ley MD   4 mg at 08/17/21 1409   • simethicone (MYLICON) chewable tablet 80 mg  80 mg Oral 4x Daily  PRN Kaitlin Singh MD   80 mg at 08/19/21 2301   • sodium chloride 0.9 % bolus 500 mL  500 mL Intravenous TID PRN Gabi Feliz APRN       • sodium chloride 0.9 % flush 10 mL  10 mL Intravenous Q12H Vic Brar MD   10 mL at 08/20/21 0732   • sodium chloride 0.9 % flush 10 mL  10 mL Intravenous PRN Vic Brar MD       • sodium chloride 0.9 % with KCl 20 mEq/L infusion  100 mL/hr Intravenous Continuous Brayan Ley  mL/hr at 08/20/21 1100 100 mL/hr at 08/20/21 1100        Physician Progress Notes (last 24 hours) (Notes from 08/19/21 1206 through 08/20/21 1206)      Vic Brar MD at 08/20/21 0921          INFECTIOUS DISEASE CONSULT/INITIAL HOSPITAL VISIT    Lawrence Lieberman  1965  [id number]    Date of Consult: 8/20/2021    Admission Date: 8/17/2021      Requesting Provider: Brayan Ley MD  Evaluating Physician: Vic Brar MD    Reason for Consultation: Colovesicular fistula    History of present illness:    Patient is a 56 y.o. male with 3-month history of colovesicular fistula which began after memorial day patient has history of diverticulitis has been on antibiotics and is trying to cut back on tobacco use is gone down from using 2 packs/day down to 1 pack/day.  Patient taken for elective surgery patient was given dose of IV ertapenem prior to surgery is now on Zosyn patient has had intervention with a cystoscopy and bilateral stent placement today by urology followed by laparoscopic low anterior resection of colovesicular fistula with mobilization of splenic flexure and resection and culture of a pelvic abscess.  Patient had loop ileostomy and appendectomy today.    Patient is waking up from anesthesia reports the sensation need to urinate but has indwelling South catheter.  Patient is awake and alert family is by bedside patient denies fevers or chills.    8/18/2021 patient sitting up by the bedside is eating breakfast clear liquids more  awake Today denies fevers rash sore throat diarrhea per wife patient is making some gas formation from loop ileostomy bag    8/19/2021 no events or night has had PICC line placed denies fevers rash or sore throat has stool output from ostomy tolerating p.o.    8/20/2021 patient is doing well no events overnight no complaints denies fevers rash or sore throat    Past Medical History:   Diagnosis Date   • Diabetes mellitus (CMS/HCC)    • Diverticulitis    • GERD (gastroesophageal reflux disease)    • Hearing loss    • History of kidney stones    • Hypertension    • Kidney stone        Past Surgical History:   Procedure Laterality Date   • COLON RESECTION N/A 8/17/2021    Procedure: LAPAROSCOPIC ASSISTED  LOW ANTERIOR RESECTION, MOBILIZATION OF SPLENIC FLEXURE, RESECTION OF COLOVESICAL FISTULA, APPENDECTOMY, LOOP IILEOSTOMY, PLACEMENT OF BILATERAL UROLOGIC CATHETERS;  Surgeon: Brayan Ley MD;  Location: [facility];  Service: General;  Laterality: N/A;   • COLONOSCOPY     • DENTAL PROCEDURE     • EYE SURGERY Right     eye socket surgery   • KIDNEY STONE SURGERY     • KNEE ARTHROSCOPY Bilateral        History reviewed. No pertinent family history.    Social History     Socioeconomic History   • Marital status: Single     Spouse name: Not on file   • Number of children: Not on file   • Years of education: Not on file   • Highest education level: Not on file   Tobacco Use   • Smoking status: Current Some Day Smoker     Packs/day: 1.00     Types: Cigarettes     Start date: 1984   • Smokeless tobacco: Never Used   • Tobacco comment: wearing a nicotine patch   Vaping Use   • Vaping Use: Never used   Substance and Sexual Activity   • Alcohol use: Never   • Drug use: Never   • Sexual activity: Defer       No Known Allergies      Medication:    Current Facility-Administered Medications:   •  acetaminophen (TYLENOL) tablet 650 mg, 650 mg, Oral, Q8H, Brayan Ley MD, 650 mg at 08/20/21 0729  •  allopurinol (ZYLOPRIM) tablet  100 mg, 100 mg, Oral, Daily, Gabi Feliz APRN, 100 mg at 08/20/21 0730  •  amLODIPine (NORVASC) tablet 5 mg, 5 mg, Oral, Daily, Gabi Feliz APRN, 5 mg at 08/20/21 0730  •  ampicillin-sulbactam 3 g/100 mL 0.9% NS (MBP), 3 g, Intravenous, Q8H, Vic Brar MD  •  dextrose (D50W) 25 g/ 50mL Intravenous Solution 25 g, 25 g, Intravenous, Q15 Min PRN, Gabi Feliz APRN  •  dextrose (GLUTOSE) oral gel 15 g, 15 g, Oral, Q15 Min PRN, Gabi Feliz APRN  •  diazePAM (VALIUM) tablet 5 mg, 5 mg, Oral, Q6H PRN, Brayan Ley MD, 5 mg at 08/20/21 0307  •  famotidine (PEPCID) tablet 20 mg, 20 mg, Oral, BID, Brayan Ley MD, 20 mg at 08/20/21 0729  •  glucagon (human recombinant) (GLUCAGEN DIAGNOSTIC) injection 1 mg, 1 mg, Subcutaneous, Q15 Min PRN, Gabi Feliz APRN  •  heparin (porcine) 5000 UNIT/ML injection 5,000 Units, 5,000 Units, Subcutaneous, Q8H, Brayan Ley MD, 5,000 Units at 08/20/21 0556  •  HYDROcodone-acetaminophen (NORCO) 7.5-325 MG per tablet 1 tablet, 1 tablet, Oral, Q4H PRN, Brayan Ley MD, 1 tablet at 08/19/21 2226  •  HYDROmorphone (DILAUDID) injection 0.5 mg, 0.5 mg, Intravenous, Q2H PRN, 0.5 mg at 08/17/21 1632 **AND** naloxone (NARCAN) injection 0.4 mg, 0.4 mg, Intravenous, Q5 Min PRN, Brayan Ley MD  •  ibuprofen (ADVIL,MOTRIN) tablet 400 mg, 400 mg, Oral, Q6H PRN, Brayan Ley MD  •  insulin detemir (LEVEMIR) injection 10 Units, 10 Units, Subcutaneous, Nightly, Gabi Feliz, MAGDA, 10 Units at 08/19/21 2030  •  insulin lispro (humaLOG) injection 0-7 Units, 0-7 Units, Subcutaneous, TID AC, Gabi Feliz, MAGDA, 2 Units at 08/19/21 1808  •  labetalol (NORMODYNE,TRANDATE) injection 10 mg, 10 mg, Intravenous, Q4H PRN, Gabi Feliz, MAGDA  •  lactated ringers infusion, 9 mL/hr, Intravenous, Continuous, Sulema Woods DO, Last Rate: 9 mL/hr at 08/17/21 0651, New Bag at 08/17/21 1047  •  micafungin 100  mg/100 mL 0.9% NS IVPB (mbp), 100 mg, Intravenous, Q24H, Vic Brar MD, 100 mg at 21 1807  •  nicotine (NICODERM CQ) 14 MG/24HR patch 1 patch, 1 patch, Transdermal, Daily, Gabi Feliz APRN, 1 patch at 21 0731  •  ondansetron (ZOFRAN) tablet 4 mg, 4 mg, Oral, Q6H PRN **OR** ondansetron (ZOFRAN) injection 4 mg, 4 mg, Intravenous, Q6H PRN, Brayan Ley MD, 4 mg at 21 1409  •  simethicone (MYLICON) chewable tablet 80 mg, 80 mg, Oral, 4x Daily PRN, Kaitlin Singh MD, 80 mg at 21 2301  •  sodium chloride 0.9 % bolus 500 mL, 500 mL, Intravenous, TID PRN, Gabi Feliz, APRN  •  sodium chloride 0.9 % flush 10 mL, 10 mL, Intravenous, Q12H, Vic Brar MD, 10 mL at 21 0732  •  sodium chloride 0.9 % flush 10 mL, 10 mL, Intravenous, PRN, Vic Brar MD  •  sodium chloride 0.9 % with KCl 20 mEq/L infusion, 100 mL/hr, Intravenous, Continuous, Brayan Ley MD, Last Rate: 100 mL/hr at 21 2331, 100 mL/hr at 21 2331    Antibiotics:  Anti-Infectives (From admission, onward)    Ordered     Dose/Rate Route Frequency Start Stop    21 0825  ampicillin-sulbactam 3 g/100 mL 0.9% NS (MBP)     Ordering Provider: Vic Brar MD    3 g Intravenous Every 8 Hours 21 1000 21 0959    21 1601  micafungin 100 mg/100 mL 0.9% NS IVPB (mbp)     Florencio Montelongo, PharmD reviewed the order on 21 0905.   Ordering Provider: Vic Brar MD    100 mg  over 60 Minutes Intravenous Every 24 Hours 21 1700 21 1659    21 0632  ertapenem (INVanz) 1 g/100 mL 0.9% NS VTB (mbp)     Ordering Provider: Brayan Ley MD    1 g  over 30 Minutes Intravenous Once 21 0634 21 0808            Review of Systems:    See HPI  Physical Exam:   Vital Signs  Temp (24hrs), Av.3 °F (36.8 °C), Min:98.3 °F (36.8 °C), Max:98.3 °F (36.8 °C)    Temp  Min: 98.3 °F (36.8 °C)  Max: 98.3 °F (36.8 °C)  BP  Min:  130/80  Max: 148/83  Pulse  Min: 59  Max: 67  Resp  Min: 18  Max: 18  SpO2  Min: 95 %  Max: 95 %    GENERAL: Awake and alert, in no acute distress.  Age-appropriate  HEENT: Normocephalic, atraumatic.  PERRL. EOMI. No conjunctival injection. No icterus.  No external oral lesions    HEART: RRR; No murmur,   LUNGS: Clear to auscultation bilaterally   ABDOMEN: Soft, nontender, nondistended.   EXT:  No edema    MSK: No joint deformity  SKIN: Warm and dry without cutaneous eruptions on Inspection/palpation.    NEURO: Oriented to PPT.  PSYCHIATRIC: Normal insight and judgement. Cooperative with PE    Laboratory Data    Results from last 7 days   Lab Units 08/20/21  0608 08/19/21  0356 08/18/21  0418   WBC 10*3/mm3 11.64* 14.54* 15.53*   HEMOGLOBIN g/dL 14.0 12.8* 14.1   HEMATOCRIT % 42.9 39.2 44.7   PLATELETS 10*3/mm3 202 205 211     Results from last 7 days   Lab Units 08/19/21  0356   SODIUM mmol/L 138   POTASSIUM mmol/L 4.1   CHLORIDE mmol/L 106   CO2 mmol/L 23.0   BUN mg/dL 15   CREATININE mg/dL 0.68*   GLUCOSE mg/dL 112*   CALCIUM mg/dL 8.5*     Results from last 7 days   Lab Units 08/16/21  1046   ALK PHOS U/L 91   BILIRUBIN mg/dL 0.4   ALT (SGPT) U/L 10   AST (SGOT) U/L 20                         Estimated Creatinine Clearance: 163.4 mL/min (A) (by C-G formula based on SCr of 0.68 mg/dL (L)).      Microbiology:  No results found for: ACANTHNAEG, AFBCX, BPERTUSSISCX, BLOODCX  No results found for: BCIDPCR, CXREFLEX, CSFCX, CULTURETIS  No results found for: CULTURES, HSVCX, URCX  No results found for: EYECULTURE, GCCX, HSVCULTURE, LABHSV  No results found for: LEGIONELLA, MRSACX, MUMPSCX, MYCOPLASCX  No results found for: NOCARDIACX, STOOLCX  No results found for: THROATCX, UNSTIMCULT, URINECX, CULTURE, VZVCULTUR  No results found for: VIRALCULTU, WOUNDCX        Radiology:  Imaging Results (Last 72 Hours)     ** No results found for the last 72 hours. **            Impression:   Pelvic abscess  Colovesicular  "fistula  Status post laparoscopic low anterior resection of colovesicular fistula  Loop ileostomy  Tobacco abuse  Weight loss  Leukocytosis with neutrophilia  Status post cystectomy and bilateral stent placement  PLAN/RECOMMENDATIONS:   Thank you for asking us to see Lawrence Lieberman, I recommend the following:  Continue piperacillin/tazobactam cover for gram-negative rods and anaerobic process  Follow-up operative cultures Enterococcus ampicillin susceptible    Change Zosyn to Unasyn 3 g IV every 8 hours continue for 2 weeks  Continue micafungin for 2 weeks  Patient to go home today  [facility] to offer home IV antibiotics    Case discussed with hospitalist, family  Once antibiotics arranged patient can go home follow-up with me on Monday  DC/DM time 30 minutes          Vic Brar MD  8/20/2021  09:21 EDT                    Electronically signed by Vic Brar MD at 08/20/21 0922     Brayan Ley MD at 08/19/21 1944          [facility] ([facility])      No acute difficulties  Good tolerance of diet, good stoma function, reasonable drain output, good activity level.    Vital Signs:  Blood pressure 133/84, pulse 51, temperature 98.1 °F (36.7 °C), temperature source Oral, resp. rate 19, height 182.9 cm (72\"), weight 116 kg (256 lb), SpO2 92 %.    Labs past 24 hours:  Lab Results (last 24 hours)     Procedure Component Value Units Date/Time    POC Glucose Once [[id number]]  (Abnormal) Collected: 08/19/21 1646    Specimen: Blood Updated: 08/19/21 1648     Glucose 159 mg/dL      Comment: Meter: [id number] : [id number] Shakir Parks       POC Glucose Once [[id number]]  (Normal) Collected: 08/19/21 1142    Specimen: Blood Updated: 08/19/21 1144     Glucose 121 mg/dL      Comment: Meter: [id number] : [id number] Shakir Parks       Tissue / Bone Culture - Tissue, Large Intestine, Sigmoid Colon [[id number]]  (Abnormal) Collected: 08/17/21 1015    Specimen: Tissue " from Large Intestine, Sigmoid Colon Updated: 08/19/21 0847     Tissue Culture Light growth (2+) Enterococcus species     Gram Stain Rare (1+) WBCs seen      No organisms seen    POC Glucose Once [[id number]]  (Normal) Collected: 08/19/21 0743    Specimen: Blood Updated: 08/19/21 0744     Glucose 109 mg/dL      Comment: Meter: [id number] : [id number] Shakir Parks       Basic Metabolic Panel [[id number]]  (Abnormal) Collected: 08/19/21 0356    Specimen: Blood Updated: 08/19/21 0507     Glucose 112 mg/dL      BUN 15 mg/dL      Creatinine 0.68 mg/dL      Sodium 138 mmol/L      Potassium 4.1 mmol/L      Chloride 106 mmol/L      CO2 23.0 mmol/L      Calcium 8.5 mg/dL      eGFR Non African Amer 122 mL/min/1.73      BUN/Creatinine Ratio 22.1     Anion Gap 9.0 mmol/L     Narrative:      GFR Normal >60  Chronic Kidney Disease <60  Kidney Failure <15      CBC & Differential [[id number]]  (Abnormal) Collected: 08/19/21 0356    Specimen: Blood Updated: 08/19/21 0430    Narrative:      The following orders were created for panel order CBC & Differential.  Procedure                               Abnormality         Status                     ---------                               -----------         ------                     CBC Auto Differential[[id number]]        Abnormal            Final result                 Please view results for these tests on the individual orders.    CBC Auto Differential [[id number]]  (Abnormal) Collected: 08/19/21 0356    Specimen: Blood Updated: 08/19/21 0430     WBC 14.54 10*3/mm3      RBC 4.33 10*6/mm3      Hemoglobin 12.8 g/dL      Hematocrit 39.2 %      MCV 90.5 fL      MCH 29.6 pg      MCHC 32.7 g/dL      RDW 14.1 %      RDW-SD 46.6 fl      MPV 10.7 fL      Platelets 205 10*3/mm3      Neutrophil % 59.9 %      Lymphocyte % 30.1 %      Monocyte % 8.3 %      Eosinophil % 1.0 %      Basophil % 0.4 %      Immature Grans % 0.3 %      Neutrophils, Absolute 8.72 10*3/mm3      Lymphocytes, Absolute  4.37 10*3/mm3      Monocytes, Absolute 1.20 10*3/mm3      Eosinophils, Absolute 0.14 10*3/mm3      Basophils, Absolute 0.06 10*3/mm3      Immature Grans, Absolute 0.05 10*3/mm3      nRBC 0.0 /100 WBC           I/O last shift:  I/O this shift:  In: 1250 [I.V.:1250]  Out: 950 [Urine:950]     PHYSICAL EXAM-  Comfortable  cv- rsr, not tachy  Abd- soft, mild distention, good stoma function, incision okay    Pathology:  Order Name Source Comment Collection Info Order Time   ANAEROBIC CULTURE Large Intestine, Sigmoid Colon  Collected By: Brayan Ley MD 8/17/2021 10:15 AM     Release to patient   Immediate        FUNGUS CULTURE Large Intestine, Sigmoid Colon  Collected By: Brayan Ley MD 8/17/2021 10:15 AM     Release to patient   Immediate        TISSUE / BONE CULTURE Large Intestine, Sigmoid Colon  Collected By: Brayan Ley MD 8/17/2021 10:15 AM     Release to patient   Immediate        AFB CULTURE Large Intestine, Sigmoid Colon  Collected By: Brayan Ley MD 8/17/2021 10:15 AM     Release to patient   Immediate        ABO/RH SPECIMEN VERIFICATION PREOP   Collected By: Bhavana Orozco, RN 8/17/2021  6:32 AM     Release to patient   Immediate        TISSUE PATHOLOGY EXAM Large Intestine, Sigmoid Colon  Collected By: Brayan Ley MD 8/17/2021 10:14 AM     Release to patient   Immediate        .    Assessment and Plan:  Doing well  Plan for bridge removal from the stoma tomorrow  DOMINIC drain removal tomorrow, South removal tomorrow-both at 6 AM  Discharge instructions reviewed, antibiotics per ID  Office follow-up in 2 weeks.  The me know if any questions or difficulties impeding discharge tomorrow--discharge after further instruction from stomal therapy.  ty    Brayan Ley MD  08/19/21  19:45 EDT    Electronically signed by Brayan Ley MD at 08/19/21 7167     Vic Brar MD at 08/19/21 1605          INFECTIOUS DISEASE CONSULT/INITIAL HOSPITAL VISIT    Lawrence King  Luisana  1965  [id number]    Date of Consult: 8/19/2021    Admission Date: 8/17/2021      Requesting Provider: Brayan Ley MD  Evaluating Physician: Vic Brar MD    Reason for Consultation: Colovesicular fistula    History of present illness:    Patient is a 56 y.o. male with 3-month history of colovesicular fistula which began after memorial day patient has history of diverticulitis has been on antibiotics and is trying to cut back on tobacco use is gone down from using 2 packs/day down to 1 pack/day.  Patient taken for elective surgery patient was given dose of IV ertapenem prior to surgery is now on Zosyn patient has had intervention with a cystoscopy and bilateral stent placement today by urology followed by laparoscopic low anterior resection of colovesicular fistula with mobilization of splenic flexure and resection and culture of a pelvic abscess.  Patient had loop ileostomy and appendectomy today.    Patient is waking up from anesthesia reports the sensation need to urinate but has indwelling South catheter.  Patient is awake and alert family is by bedside patient denies fevers or chills.    8/18/2021 patient sitting up by the bedside is eating breakfast clear liquids more awake Today denies fevers rash sore throat diarrhea per wife patient is making some gas formation from loop ileostomy bag    8/19/2021 no events or night has had PICC line placed denies fevers rash or sore throat has stool output from ostomy tolerating p.o.    Past Medical History:   Diagnosis Date   • Diabetes mellitus (CMS/HCC)    • Diverticulitis    • GERD (gastroesophageal reflux disease)    • Hearing loss    • History of kidney stones    • Hypertension    • Kidney stone        Past Surgical History:   Procedure Laterality Date   • COLON RESECTION N/A 8/17/2021    Procedure: LAPAROSCOPIC ASSISTED  LOW ANTERIOR RESECTION, MOBILIZATION OF SPLENIC FLEXURE, RESECTION OF COLOVESICAL FISTULA, APPENDECTOMY, LOOP IILEOSTOMY,  PLACEMENT OF BILATERAL UROLOGIC CATHETERS;  Surgeon: Brayan Ley MD;  Location: [facility] [place];  Service: General;  Laterality: N/A;   • COLONOSCOPY     • DENTAL PROCEDURE     • EYE SURGERY Right     eye socket surgery   • KIDNEY STONE SURGERY     • KNEE ARTHROSCOPY Bilateral        History reviewed. No pertinent family history.    Social History     Socioeconomic History   • Marital status: Single     Spouse name: Not on file   • Number of children: Not on file   • Years of education: Not on file   • Highest education level: Not on file   Tobacco Use   • Smoking status: Current Some Day Smoker     Packs/day: 1.00     Types: Cigarettes     Start date: 1984   • Smokeless tobacco: Never Used   • Tobacco comment: wearing a nicotine patch   Vaping Use   • Vaping Use: Never used   Substance and Sexual Activity   • Alcohol use: Never   • Drug use: Never   • Sexual activity: Defer       No Known Allergies      Medication:    Current Facility-Administered Medications:   •  acetaminophen (TYLENOL) tablet 650 mg, 650 mg, Oral, Q8H, Brayan Ley MD, 650 mg at 08/19/21 0950  •  allopurinol (ZYLOPRIM) tablet 100 mg, 100 mg, Oral, Daily, Gabi Feliz APRN, 100 mg at 08/19/21 0950  •  amLODIPine (NORVASC) tablet 5 mg, 5 mg, Oral, Daily, Gabi Feliz APRN, 5 mg at 08/19/21 0950  •  dextrose (D50W) 25 g/ 50mL Intravenous Solution 25 g, 25 g, Intravenous, Q15 Min PRN, Gabi Feliz APRN  •  dextrose (GLUTOSE) oral gel 15 g, 15 g, Oral, Q15 Min PRN, Gabi Feliz APRN  •  diazePAM (VALIUM) tablet 5 mg, 5 mg, Oral, Q6H PRN, Brayan Ley MD, 5 mg at 08/17/21 1305  •  famotidine (PEPCID) tablet 20 mg, 20 mg, Oral, BID, Brayan Ley MD, 20 mg at 08/19/21 0950  •  glucagon (human recombinant) (GLUCAGEN DIAGNOSTIC) injection 1 mg, 1 mg, Subcutaneous, Q15 Min PRN, Gabi Feliz APRN  •  heparin (porcine) 5000 UNIT/ML injection 5,000 Units, 5,000 Units, Subcutaneous, Q8H, Av,  Brayan CORDOVA MD, 5,000 Units at 08/19/21 1452  •  HYDROcodone-acetaminophen (NORCO) 7.5-325 MG per tablet 1 tablet, 1 tablet, Oral, Q4H PRN, Brayan Ley MD, 1 tablet at 08/18/21 2320  •  HYDROmorphone (DILAUDID) injection 0.5 mg, 0.5 mg, Intravenous, Q2H PRN, 0.5 mg at 08/17/21 1632 **AND** naloxone (NARCAN) injection 0.4 mg, 0.4 mg, Intravenous, Q5 Min PRN, Brayan Ley MD  •  ibuprofen (ADVIL,MOTRIN) tablet 400 mg, 400 mg, Oral, Q6H PRN, Brayan Ley MD  •  insulin detemir (LEVEMIR) injection 10 Units, 10 Units, Subcutaneous, Nightly, Gabi Feliz APRN, 10 Units at 08/18/21 2051  •  insulin lispro (humaLOG) injection 0-7 Units, 0-7 Units, Subcutaneous, TID AC, Gabi Feliz APRN, 2 Units at 08/18/21 1840  •  labetalol (NORMODYNE,TRANDATE) injection 10 mg, 10 mg, Intravenous, Q4H PRN, Gabi Feliz APRN  •  lactated ringers infusion, 9 mL/hr, Intravenous, Continuous, Sulema Woods DO, Last Rate: 9 mL/hr at 08/17/21 0651, New Bag at 08/17/21 1047  •  micafungin 100 mg/100 mL 0.9% NS IVPB (mbp), 100 mg, Intravenous, Q24H, Vic Brar MD, 100 mg at 08/18/21 1600  •  nicotine (NICODERM CQ) 14 MG/24HR patch 1 patch, 1 patch, Transdermal, Daily, Gabi Feliz APRN, 1 patch at 08/19/21 0954  •  ondansetron (ZOFRAN) tablet 4 mg, 4 mg, Oral, Q6H PRN **OR** ondansetron (ZOFRAN) injection 4 mg, 4 mg, Intravenous, Q6H PRN, Brayan Ley MD, 4 mg at 08/17/21 1409  •  piperacillin-tazobactam (ZOSYN) 3.375 g in iso-osmotic dextrose 50 ml (premix), 3.375 g, Intravenous, Q8H, Brayan Ley MD, 3.375 g at 08/19/21 0950  •  scopolamine patch 1 mg/72 hr, 1 patch, Transdermal, Continuous, Brayan Ley MD, 1 patch at 08/17/21 0659  •  sodium chloride 0.9 % bolus 500 mL, 500 mL, Intravenous, TID PRN, Gabi Feliz, APRN  •  sodium chloride 0.9 % flush 10 mL, 10 mL, Intravenous, Q12H, Vic Brar MD  •  sodium chloride 0.9 % flush 10 mL, 10 mL,  Intravenous, PRN, Vic Brar MD  •  sodium chloride 0.9 % with KCl 20 mEq/L infusion, 100 mL/hr, Intravenous, Continuous, Brayan Ley MD, Last Rate: 100 mL/hr at 21, 100 mL/hr at 21    Antibiotics:  Anti-Infectives (From admission, onward)    Ordered     Dose/Rate Route Frequency Start Stop    21 1159  piperacillin-tazobactam (ZOSYN) 3.375 g in iso-osmotic dextrose 50 ml (premix)     Florencio Montelongo, PharmD reviewed the order on 21.   Ordering Provider: Brayan Ley MD    3.375 g  over 4 Hours Intravenous Every 8 Hours 21 1800 21 1759    21 1601  micafungin 100 mg/100 mL 0.9% NS IVPB (mbp)     Florencio Montelongo, PharmD reviewed the order on 21.   Ordering Provider: Vic Brar MD    100 mg  over 60 Minutes Intravenous Every 24 Hours 21 1700 21 1659    21 0632  ertapenem (INVanz) 1 g/100 mL 0.9% NS VTB (mbp)     Ordering Provider: Brayan Ley MD    1 g  over 30 Minutes Intravenous Once 21 0634 21 0808            Review of Systems:    See HPI  Physical Exam:   Vital Signs  Temp (24hrs), Av °F (36.7 °C), Min:97.8 °F (36.6 °C), Max:98.2 °F (36.8 °C)    Temp  Min: 97.8 °F (36.6 °C)  Max: 98.2 °F (36.8 °C)  BP  Min: 124/69  Max: 133/84  Pulse  Min: 51  Max: 63  Resp  Min: 18  Max: 19  SpO2  Min: 92 %  Max: 93 %    GENERAL: Awake and alert, in no acute distress.  Age-appropriate  HEENT: Normocephalic, atraumatic.  PERRL. EOMI. No conjunctival injection. No icterus.  No external oral lesions    HEART: RRR; No murmur,   LUNGS: Clear to auscultation bilaterally   ABDOMEN: Soft, nontender, nondistended.   EXT:  No cyanosis, clubbing or edema.     MSK: No joint deformity  SKIN: Warm and dry without cutaneous eruptions on Inspection/palpation.    NEURO: Oriented to PPT.  PSYCHIATRIC: Normal insight and judgement. Cooperative with PE    Laboratory Data    Results from last 7 days   Lab Units 21  7569  08/18/21  0418 08/16/21  1046   WBC 10*3/mm3 14.54* 15.53* 14.83*   HEMOGLOBIN g/dL 12.8* 14.1 17.0   HEMATOCRIT % 39.2 44.7 51.3*   PLATELETS 10*3/mm3 205 211 252     Results from last 7 days   Lab Units 08/19/21  0356   SODIUM mmol/L 138   POTASSIUM mmol/L 4.1   CHLORIDE mmol/L 106   CO2 mmol/L 23.0   BUN mg/dL 15   CREATININE mg/dL 0.68*   GLUCOSE mg/dL 112*   CALCIUM mg/dL 8.5*     Results from last 7 days   Lab Units 08/16/21  1046   ALK PHOS U/L 91   BILIRUBIN mg/dL 0.4   ALT (SGPT) U/L 10   AST (SGOT) U/L 20                         Estimated Creatinine Clearance: 163.4 mL/min (A) (by C-G formula based on SCr of 0.68 mg/dL (L)).      Microbiology:  No results found for: ACANTHNAEG, AFBCX, BPERTUSSISCX, BLOODCX  No results found for: BCIDPCR, CXREFLEX, CSFCX, CULTURETIS  No results found for: CULTURES, HSVCX, URCX  No results found for: EYECULTURE, GCCX, HSVCULTURE, LABHSV  No results found for: LEGIONELLA, MRSACX, MUMPSCX, MYCOPLASCX  No results found for: NOCARDIACX, STOOLCX  No results found for: THROATCX, UNSTIMCULT, URINECX, CULTURE, VZVCULTUR  No results found for: VIRALCULTU, WOUNDCX        Radiology:  Imaging Results (Last 72 Hours)     ** No results found for the last 72 hours. **            Impression:   Pelvic abscess  Colovesicular fistula  Status post laparoscopic low anterior resection of colovesicular fistula  Loop ileostomy  Tobacco abuse  Weight loss  Leukocytosis with neutrophilia  Status post cystectomy and bilateral stent placement  PLAN/RECOMMENDATIONS:   Thank you for asking us to see Lawrence Lieberman, I recommend the following:  Continue piperacillin/tazobactam cover for gram-negative rods and anaerobic process  Follow-up operative cultures Enterococcus species pending  Continue micafungin 100 mg IV now on daily    Order PICC line for tomorrow    Anticipate 2 to 3 weeks of IV antibiotics  Order PICC line    Discussed case with family      Vic Brar MD  8/19/2021  16:03  EDT                    Electronically signed by Vic Brar MD at 08/19/21 2620

## 2021-08-20 NOTE — CASE MANAGEMENT/SOCIAL WORK
Continued Stay Note  [facility]     Patient Name: Lawrence Lieberman  MRN: [id number]  Today's Date: 8/20/2021    Admit Date: 8/17/2021    Discharge Plan     Row Name 08/20/21 1210       Plan    Plan  Home with [facility]    Patient/Family in Agreement with Plan  yes    Plan Comments  Spoke with vinny at [facility] and faxed referral. Plan will be home with [facility]. CM will continue to follow.    Final Discharge Disposition Code  06 - home with home health care        Discharge Codes    No documentation.       Expected Discharge Date and Time     Expected Discharge Date Expected Discharge Time    Aug 20, 2021             Ten Mota RN

## 2021-08-20 NOTE — PROGRESS NOTES
INFECTIOUS DISEASE CONSULT/INITIAL HOSPITAL VISIT    Lawrence Lieberman  1965  [id number]    Date of Consult: 8/20/2021    Admission Date: 8/17/2021      Requesting Provider: Brayan Ley MD  Evaluating Physician: Vic Brar MD    Reason for Consultation: Colovesicular fistula    History of present illness:    Patient is a 56 y.o. male with 3-month history of colovesicular fistula which began after memorial day patient has history of diverticulitis has been on antibiotics and is trying to cut back on tobacco use is gone down from using 2 packs/day down to 1 pack/day.  Patient taken for elective surgery patient was given dose of IV ertapenem prior to surgery is now on Zosyn patient has had intervention with a cystoscopy and bilateral stent placement today by urology followed by laparoscopic low anterior resection of colovesicular fistula with mobilization of splenic flexure and resection and culture of a pelvic abscess.  Patient had loop ileostomy and appendectomy today.    Patient is waking up from anesthesia reports the sensation need to urinate but has indwelling South catheter.  Patient is awake and alert family is by bedside patient denies fevers or chills.    8/18/2021 patient sitting up by the bedside is eating breakfast clear liquids more awake Today denies fevers rash sore throat diarrhea per wife patient is making some gas formation from loop ileostomy bag    8/19/2021 no events or night has had PICC line placed denies fevers rash or sore throat has stool output from ostomy tolerating p.o.    8/20/2021 patient is doing well no events overnight no complaints denies fevers rash or sore throat    Past Medical History:   Diagnosis Date   • Diabetes mellitus (CMS/HCC)    • Diverticulitis    • GERD (gastroesophageal reflux disease)    • Hearing loss    • History of kidney stones    • Hypertension    • Kidney stone        Past Surgical History:   Procedure Laterality Date   • COLON RESECTION N/A  8/17/2021    Procedure: LAPAROSCOPIC ASSISTED  LOW ANTERIOR RESECTION, MOBILIZATION OF SPLENIC FLEXURE, RESECTION OF COLOVESICAL FISTULA, APPENDECTOMY, LOOP IILEOSTOMY, PLACEMENT OF BILATERAL UROLOGIC CATHETERS;  Surgeon: Brayan Ley MD;  Location: [facility];  Service: General;  Laterality: N/A;   • COLONOSCOPY     • DENTAL PROCEDURE     • EYE SURGERY Right     eye socket surgery   • KIDNEY STONE SURGERY     • KNEE ARTHROSCOPY Bilateral        History reviewed. No pertinent family history.    Social History     Socioeconomic History   • Marital status: Single     Spouse name: Not on file   • Number of children: Not on file   • Years of education: Not on file   • Highest education level: Not on file   Tobacco Use   • Smoking status: Current Some Day Smoker     Packs/day: 1.00     Types: Cigarettes     Start date: 1984   • Smokeless tobacco: Never Used   • Tobacco comment: wearing a nicotine patch   Vaping Use   • Vaping Use: Never used   Substance and Sexual Activity   • Alcohol use: Never   • Drug use: Never   • Sexual activity: Defer       No Known Allergies      Medication:    Current Facility-Administered Medications:   •  acetaminophen (TYLENOL) tablet 650 mg, 650 mg, Oral, Q8H, Brayan Ley MD, 650 mg at 08/20/21 0729  •  allopurinol (ZYLOPRIM) tablet 100 mg, 100 mg, Oral, Daily, Gabi Feliz APRN, 100 mg at 08/20/21 0730  •  amLODIPine (NORVASC) tablet 5 mg, 5 mg, Oral, Daily, Gabi Feliz APRN, 5 mg at 08/20/21 0730  •  ampicillin-sulbactam 3 g/100 mL 0.9% NS (MBP), 3 g, Intravenous, Q8H, Vic Brar MD  •  dextrose (D50W) 25 g/ 50mL Intravenous Solution 25 g, 25 g, Intravenous, Q15 Min PRN, Gabi Feliz APRN  •  dextrose (GLUTOSE) oral gel 15 g, 15 g, Oral, Q15 Min PRN, Gabi Feliz APRN  •  diazePAM (VALIUM) tablet 5 mg, 5 mg, Oral, Q6H PRN, Brayan Ley MD, 5 mg at 08/20/21 0307  •  famotidine (PEPCID) tablet 20 mg, 20 mg, Oral, BID, Av  Brayan CORDOVA MD, 20 mg at 08/20/21 0729  •  glucagon (human recombinant) (GLUCAGEN DIAGNOSTIC) injection 1 mg, 1 mg, Subcutaneous, Q15 Min PRN, Gabi Feliz APRN  •  heparin (porcine) 5000 UNIT/ML injection 5,000 Units, 5,000 Units, Subcutaneous, Q8H, Brayan Ley MD, 5,000 Units at 08/20/21 0556  •  HYDROcodone-acetaminophen (NORCO) 7.5-325 MG per tablet 1 tablet, 1 tablet, Oral, Q4H PRN, Brayan Ley MD, 1 tablet at 08/19/21 2226  •  HYDROmorphone (DILAUDID) injection 0.5 mg, 0.5 mg, Intravenous, Q2H PRN, 0.5 mg at 08/17/21 1632 **AND** naloxone (NARCAN) injection 0.4 mg, 0.4 mg, Intravenous, Q5 Min PRN, Brayan Ley MD  •  ibuprofen (ADVIL,MOTRIN) tablet 400 mg, 400 mg, Oral, Q6H PRN, Brayan Ley MD  •  insulin detemir (LEVEMIR) injection 10 Units, 10 Units, Subcutaneous, Nightly, Gabi Feliz APRN, 10 Units at 08/19/21 2030  •  insulin lispro (humaLOG) injection 0-7 Units, 0-7 Units, Subcutaneous, TID AC, Gabi Feliz APRN, 2 Units at 08/19/21 1808  •  labetalol (NORMODYNE,TRANDATE) injection 10 mg, 10 mg, Intravenous, Q4H PRN, Gabi Feliz APRN  •  lactated ringers infusion, 9 mL/hr, Intravenous, Continuous, Sulema Woods DO, Last Rate: 9 mL/hr at 08/17/21 0651, New Bag at 08/17/21 1047  •  micafungin 100 mg/100 mL 0.9% NS IVPB (mbp), 100 mg, Intravenous, Q24H, Vic Brar MD, 100 mg at 08/19/21 1807  •  nicotine (NICODERM CQ) 14 MG/24HR patch 1 patch, 1 patch, Transdermal, Daily, Gabi Feliz, MAGDA, 1 patch at 08/20/21 0731  •  ondansetron (ZOFRAN) tablet 4 mg, 4 mg, Oral, Q6H PRN **OR** ondansetron (ZOFRAN) injection 4 mg, 4 mg, Intravenous, Q6H PRN, Brayan Ley MD, 4 mg at 08/17/21 1409  •  simethicone (MYLICON) chewable tablet 80 mg, 80 mg, Oral, 4x Daily PRN, Kaitlin Singh MD, 80 mg at 08/19/21 2301  •  sodium chloride 0.9 % bolus 500 mL, 500 mL, Intravenous, TID PRN, Gabi Feliz, MAGDA  •  sodium chloride 0.9  % flush 10 mL, 10 mL, Intravenous, Q12H, Vic Brar MD, 10 mL at 21 0732  •  sodium chloride 0.9 % flush 10 mL, 10 mL, Intravenous, PRN, Vic Brar MD  •  sodium chloride 0.9 % with KCl 20 mEq/L infusion, 100 mL/hr, Intravenous, Continuous, Brayan Ley MD, Last Rate: 100 mL/hr at 21 2331, 100 mL/hr at 21 2331    Antibiotics:  Anti-Infectives (From admission, onward)    Ordered     Dose/Rate Route Frequency Start Stop    21 0825  ampicillin-sulbactam 3 g/100 mL 0.9% NS (MBP)     Ordering Provider: Vic Brar MD    3 g Intravenous Every 8 Hours 21 1000 21 0959    21 1601  micafungin 100 mg/100 mL 0.9% NS IVPB (mbp)     Florencio Montelongo, PharmD reviewed the order on 21 0905.   Ordering Provider: Vic Brar MD    100 mg  over 60 Minutes Intravenous Every 24 Hours 21 1700 21 1659    21 0632  ertapenem (INVanz) 1 g/100 mL 0.9% NS VTB (mbp)     Ordering Provider: Brayan Ley MD    1 g  over 30 Minutes Intravenous Once 21 0634 21 0808            Review of Systems:    See HPI  Physical Exam:   Vital Signs  Temp (24hrs), Av.3 °F (36.8 °C), Min:98.3 °F (36.8 °C), Max:98.3 °F (36.8 °C)    Temp  Min: 98.3 °F (36.8 °C)  Max: 98.3 °F (36.8 °C)  BP  Min: 130/80  Max: 148/83  Pulse  Min: 59  Max: 67  Resp  Min: 18  Max: 18  SpO2  Min: 95 %  Max: 95 %    GENERAL: Awake and alert, in no acute distress.  Age-appropriate  HEENT: Normocephalic, atraumatic.  PERRL. EOMI. No conjunctival injection. No icterus.  No external oral lesions    HEART: RRR; No murmur,   LUNGS: Clear to auscultation bilaterally   ABDOMEN: Soft, nontender, nondistended.   EXT:  No edema    MSK: No joint deformity  SKIN: Warm and dry without cutaneous eruptions on Inspection/palpation.    NEURO: Oriented to PPT.  PSYCHIATRIC: Normal insight and judgement. Cooperative with PE    Laboratory Data    Results from last 7 days   Lab Units  08/20/21  0608 08/19/21  0356 08/18/21  0418   WBC 10*3/mm3 11.64* 14.54* 15.53*   HEMOGLOBIN g/dL 14.0 12.8* 14.1   HEMATOCRIT % 42.9 39.2 44.7   PLATELETS 10*3/mm3 202 205 211     Results from last 7 days   Lab Units 08/19/21  0356   SODIUM mmol/L 138   POTASSIUM mmol/L 4.1   CHLORIDE mmol/L 106   CO2 mmol/L 23.0   BUN mg/dL 15   CREATININE mg/dL 0.68*   GLUCOSE mg/dL 112*   CALCIUM mg/dL 8.5*     Results from last 7 days   Lab Units 08/16/21  1046   ALK PHOS U/L 91   BILIRUBIN mg/dL 0.4   ALT (SGPT) U/L 10   AST (SGOT) U/L 20                         Estimated Creatinine Clearance: 163.4 mL/min (A) (by C-G formula based on SCr of 0.68 mg/dL (L)).      Microbiology:  No results found for: ACANTHNAEG, AFBCX, BPERTUSSISCX, BLOODCX  No results found for: BCIDPCR, CXREFLEX, CSFCX, CULTURETIS  No results found for: CULTURES, HSVCX, URCX  No results found for: EYECULTURE, GCCX, HSVCULTURE, LABHSV  No results found for: LEGIONELLA, MRSACX, MUMPSCX, MYCOPLASCX  No results found for: NOCARDIACX, STOOLCX  No results found for: THROATCX, UNSTIMCULT, URINECX, CULTURE, VZVCULTUR  No results found for: VIRALCULTU, WOUNDCX        Radiology:  Imaging Results (Last 72 Hours)     ** No results found for the last 72 hours. **            Impression:   Pelvic abscess  Colovesicular fistula  Status post laparoscopic low anterior resection of colovesicular fistula  Loop ileostomy  Tobacco abuse  Weight loss  Leukocytosis with neutrophilia  Status post cystectomy and bilateral stent placement  PLAN/RECOMMENDATIONS:   Thank you for asking us to see Lawrence Lieberman, I recommend the following:  Continue piperacillin/tazobactam cover for gram-negative rods and anaerobic process  Follow-up operative cultures Enterococcus ampicillin susceptible    Change Zosyn to Unasyn 3 g IV every 8 hours continue for 2 weeks  Continue micafungin for 2 weeks  Patient to go home today  [facility] to offer home IV antibiotics    Case discussed with hospitalist,  family  Once antibiotics arranged patient can go home follow-up with me on Monday  DC/DM time 30 minutes          Vic Brar MD  8/20/2021  09:21 EDT

## 2021-08-20 NOTE — PLAN OF CARE
Goal Outcome Evaluation:  Plan of Care Reviewed With: patient        Progress: improving    Pts VSS.  Pt complained of stomach pressure like gas.  Simethicone order given per Dr MISHRA.  Pt stated this helped him.  Pt with good urine output.  Pt to be D/Cd to home with family today.  South to be dcd at 06:00 a.m. today.   No acute distress is noted.  Will monitor......

## 2021-08-20 NOTE — DISCHARGE SUMMARY
Patient Name: Lawrence Lieberman  MRN: [id number]  : 1965  DOS: 2021    Attending: Bryaan Ley MD    Primary Care Provider: Chano Valverde MD    Date of Admission:.2021  5:13 AM    Date of Discharge:  2021    Discharge Diagnosis:     S/P LAR, appy, loop ileostomy, takedown of colovesical fistula    DM2 (diabetes mellitus, type 2) (CMS/HCC)    Diverticulitis    Hypertension    Colovesical fistula    Acute postoperative pain    Leukocytosis, improved      Hospital Course  At admit    Patient is a pleasant 56 y.o. male presented for scheduled surgery by Dr. Ley with assistance from Dr. Rosaline Keys.  He underwent LAPAROSCOPIC LOW ANTERIOR, anastomosis at 5 cm/below the peritoneal reflection and inflammation RESECTION OF COLOVESICAL FISTULA MOBILIZATION OF SPLENIC FLEXURE RESECTION AND CULTURE OF PELVIC ABSCESS APPENDECTOMY LOOP ILEOSTOMY by Dr. Ley and placement of bilateral urologic catheters by Dr. Rosaline Keys.  Surgery went well and he was admitted for further medical management.     When seen in PACU, patient is having significant incisional pain, 10 out of 10 and appears very anxious.  Nurse reports he was very anxious thrashing around in the bed when he came to them from the OR.  He has been given IV pain medicine with little relief.  IV Valium ordered for anxiety.  He is unable to give a thorough history at this time.     (Above is noted, agree.  Seen in his room afterwards, still complaining of pain though he is quite drowsy and family noted intermittently his oxygen saturation dropping some, he is requiring 4 L of oxygen via nasal cannula currently.)wy    After admit    Patient was provided pain medication as needed for pain control.  He received DVT prophylaxis with subcutaneous heparin as well as mechanicals      Patient was started on clear liquid diet, this was advanced and he showed evidence of bowel function return.  He had good output from his stoma.    He  was seen by WOCN, received extensive education and instructions during his stay. All pt questions were addressed;  education folder, stomal care, fluids, diet, activity, work, lifting restrictions for 6-8 weeks were reviewed.    IV antibiotics were given during his stay under the direction of Dr. Vic Brar with [facility].  Transition to a regimen to be given at home, education was provided to patient's significant other.  Patient's daughter is a nurse as well.    A PICC line was placed during his stay.    He tolerated diet without difficulty.    A South catheter which was placed intraoperatively has since been removed and patient has voided without difficulty.    He used an IS for atelectasis prophylaxis.    Home medications were resumed as appropriate, and labs were monitored and remained fairly stable.      With the progress he has made, pt is ready for DC home today.      Discussed with patient regarding plan and he shows understanding and agreement.       Procedures Performed  Procedure(s):  LAPAROSCOPIC ASSISTED  LOW ANTERIOR RESECTION, MOBILIZATION OF SPLENIC FLEXURE, RESECTION OF COLOVESICAL FISTULA, APPENDECTOMY, LOOP IILEOSTOMY, PLACEMENT OF BILATERAL UROLOGIC CATHETERS       Pertinent Test Results:    I reviewed the patient's new clinical results.   Results from last 7 days   Lab Units 08/20/21  0608 08/19/21  0356 08/18/21  0418   WBC 10*3/mm3 11.64* 14.54* 15.53*   HEMOGLOBIN g/dL 14.0 12.8* 14.1   HEMATOCRIT % 42.9 39.2 44.7   PLATELETS 10*3/mm3 202 205 211     Results from last 7 days   Lab Units 08/19/21  0356 08/18/21  0418 08/16/21  1046   SODIUM mmol/L 138 139 136   POTASSIUM mmol/L 4.1 4.7 3.7   CHLORIDE mmol/L 106 104 101   CO2 mmol/L 23.0 25.0 23.0   BUN mg/dL 15 10 8   CREATININE mg/dL 0.68* 0.69* 0.76   CALCIUM mg/dL 8.5* 9.2 9.5   BILIRUBIN mg/dL  --   --  0.4   ALK PHOS U/L  --   --  91   ALT (SGPT) U/L  --   --  10   AST (SGOT) U/L  --   --  20   GLUCOSE mg/dL 112* 172* 164*     I reviewed  "the patient's new imaging including images and reports.      Discharge Assessment:      Visit Vitals  /80 (BP Location: Left arm, Patient Position: Lying)   Pulse 59   Temp 98.3 °F (36.8 °C) (Oral)   Resp 18   Ht 182.9 cm (72\")   Wt 116 kg (256 lb)   SpO2 95%   BMI 34.72 kg/m²     Temp (24hrs), Av.3 °F (36.8 °C), Min:98.3 °F (36.8 °C), Max:98.3 °F (36.8 °C)      General Appearance:    Alert, cooperative, in no acute distress   Lungs:     Clear to auscultation,respirations regular, even and                   unlabored    Heart:    Regular rhythm and normal rate, normal S1 and S2    Abdomen:   Soft and benign with clean incisions.  Stoma is pink with good output.  Bridge present, to be removed prior to discharge with last educational session by wound care stoma nurse.   Extremities:   Moves all extremities well, no edema, no cyanosis, no              redness   Pulses:   Pulses palpable and equal bilaterally   Skin:   No bleeding, bruising or rash            Discharge Disposition:          Discharge Medications      New Medications      Instructions Start Date   ampicillin-sulbactam  Commonly known as: UNASYN   3 g, Intravenous, Every 8 Hours      HYDROcodone-acetaminophen 7.5-325 MG per tablet  Commonly known as: Norco   1 tablet, Oral, Every 4 Hours PRN      micafungin  Commonly known as: MYCAMINE   100 mg, Intravenous, Every 24 Hours         Continue These Medications      Instructions Start Date   allopurinol 100 MG tablet  Commonly known as: ZYLOPRIM   100 mg, Oral, Daily      amLODIPine 5 MG tablet  Commonly known as: NORVASC   5 mg, Oral, Daily      Jardiance 10 MG tablet tablet  Generic drug: empagliflozin   10 mg, Oral, Daily      nicotine 14 MG/24HR patch  Commonly known as: NICODERM CQ   1 patch, Transdermal, Every 24 Hours      SITagliptin 100 MG tablet  Commonly known as: JANUVIA   100 mg, Oral, Daily         Stop These Medications    levoFLOXacin 750 MG tablet  Commonly known as: LEVAQUIN   "   metroNIDAZOLE 500 MG tablet  Commonly known as: FLAGYL            Discharge Diet: Soft GI    Activity at Discharge: Ambulate.  Restrictions per Dr. Ley.    Follow-up Appointments  Brayan Ley MD per his orders.  Vic Brar MD per his orders.    Discharge took over 30 min    Dragon disclaimer:  Part of this encounter note is an electronic transcription/translation of spoken language to printed text. The electronic translation of spoken language may permit erroneous, or at times, nonsensical words or phrases to be inadvertently transcribed; Although I have reviewed the note for such errors, some may still exist.       Kaitlin Singh MD  08/20/21  13:42 EDT

## 2021-08-22 LAB — BACTERIA SPEC ANAEROBE CULT: NORMAL

## 2021-09-09 ENCOUNTER — TRANSCRIBE ORDERS (OUTPATIENT)
Dept: ADMINISTRATIVE | Facility: HOSPITAL | Age: 56
End: 2021-09-09

## 2021-09-09 DIAGNOSIS — N32.1 COLO-VESICAL FISTULA: Primary | ICD-10-CM

## 2021-09-19 LAB — FUNGUS WND CULT: ABNORMAL

## 2021-09-21 ENCOUNTER — HOSPITAL ENCOUNTER (OUTPATIENT)
Dept: WOUND CARE | Facility: HOSPITAL | Age: 56
Discharge: HOME | End: 2021-09-21

## 2021-09-21 NOTE — NURSING NOTE
Wocn follow up for ostomy education    Surgery date: 08/17/2021  Ostomy type: loop ileostomy  Per Dr. Ley    Stoma size & Ax: stoma is about 29mm and looks absolutely fantastic. Protruding above skin level, plat peristomal surface for pouching.     Appliance in place: patient uses kinga 8901; had to place 8331 as we do not regularly carrier this pouch.     Last pouch change: 09/21/2021    Education performed: concerning area of slough to medial aspect found to be attached to several long sutures. Mild epithelial erosion here. woc able to debride most of slough. woc tugged on sutures and they would not budge. Patient is five weeks out from surgery. A call was placed to DR. Ley who called back and gave reassurance to woc that sutures dissolved by enzyme and just needed longer to dissolve. patient did not have much erythema and area was probed - no undermining, no induration, no pain.     Area was cleansed thoroughly and hydrofera blue ready was applied to slough and peristomal erosion, window paned with stoma paste and pouch applied over. Patient instructed to change pouch every three days and observe and to call woc back with results. Hopefully hydrofera will heal skin if not we can try crusting.     Patient also stated he was having troubles with getting the right supplies from [facility]. wo will call to see if relationship can be salvaged.     Wo will continue to follow. Please contact with questions or concerns.

## 2021-09-28 LAB
MYCOBACTERIUM SPEC CULT: NORMAL
NIGHT BLUE STAIN TISS: NORMAL

## 2021-12-06 ENCOUNTER — HOSPITAL ENCOUNTER (OUTPATIENT)
Dept: GENERAL RADIOLOGY | Facility: HOSPITAL | Age: 56
Discharge: HOME | End: 2021-12-06
Admitting: COLON & RECTAL SURGERY

## 2021-12-06 DIAGNOSIS — N32.1 COLO-VESICAL FISTULA: ICD-10-CM

## 2021-12-06 PROCEDURE — 0 DIATRIZOATE MEGLUMINE & SODIUM PER 1 ML: Performed by: COLON & RECTAL SURGERY

## 2021-12-06 PROCEDURE — 74270 X-RAY XM COLON 1CNTRST STD: CPT

## 2021-12-06 RX ADMIN — DIATRIZOATE MEGLUMINE AND DIATRIZOATE SODIUM 480 ML: 660; 100 LIQUID ORAL; RECTAL at 10:01

## 2022-01-26 ENCOUNTER — APPOINTMENT (OUTPATIENT)
Dept: PREADMISSION TESTING | Facility: HOSPITAL | Age: 57
End: 2022-01-26

## 2024-11-19 ENCOUNTER — TRANSCRIBE ORDERS (OUTPATIENT)
Dept: ADMINISTRATIVE | Facility: HOSPITAL | Age: 59
End: 2024-11-19
Payer: COMMERCIAL

## 2024-11-19 DIAGNOSIS — R59.1 GENERALIZED ENLARGED LYMPH NODES: Primary | ICD-10-CM

## 2024-11-20 ENCOUNTER — HOSPITAL ENCOUNTER (OUTPATIENT)
Dept: PET IMAGING | Facility: HOSPITAL | Age: 59
Discharge: HOME | End: 2024-11-20
Payer: COMMERCIAL

## 2024-11-20 DIAGNOSIS — R59.1 GENERALIZED ENLARGED LYMPH NODES: ICD-10-CM

## 2024-11-20 LAB — GLUCOSE BLDC GLUCOMTR-MCNC: 121 MG/DL (ref 70–130)

## 2024-11-20 PROCEDURE — 78815 PET IMAGE W/CT SKULL-THIGH: CPT

## 2024-11-20 PROCEDURE — 82948 REAGENT STRIP/BLOOD GLUCOSE: CPT

## 2024-11-20 PROCEDURE — 34310000005 FLUDEOXYGLUCOSE F18 SOLUTION: Performed by: FAMILY MEDICINE

## 2024-11-20 PROCEDURE — A9552 F18 FDG: HCPCS | Performed by: FAMILY MEDICINE

## 2024-11-20 RX ADMIN — FLUDEOXYGLUCOSE F 18 1 DOSE: 200 INJECTION, SOLUTION INTRAVENOUS at 11:55

## (undated) DEVICE — PK LAP LASR CHOLE 10

## (undated) DEVICE — SUT PROLN 2/0 PC3 8833H

## (undated) DEVICE — 3M™ IOBAN™ 2 ANTIMICROBIAL INCISE DRAPE 6650EZ: Brand: IOBAN™ 2

## (undated) DEVICE — SUT MNCRYL PLS ANTIB UD 4/0 PS2 18IN

## (undated) DEVICE — CVR HNDL LIGHT RIGID

## (undated) DEVICE — ANTIBACTERIAL UNDYED BRAIDED (POLYGLACTIN 910), SYNTHETIC ABSORBABLE SUTURE: Brand: COATED VICRYL

## (undated) DEVICE — [HIGH FLOW INSUFFLATOR,  DO NOT USE IF PACKAGE IS DAMAGED,  KEEP DRY,  KEEP AWAY FROM SUNLIGHT,  PROTECT FROM HEAT AND RADIOACTIVE SOURCES.]: Brand: PNEUMOSURE

## (undated) DEVICE — INTENDED FOR TISSUE SEPARATION, AND OTHER PROCEDURES THAT REQUIRE A SHARP SURGICAL BLADE TO PUNCTURE OR CUT.: Brand: BARD-PARKER ® STAINLESS STEEL BLADES

## (undated) DEVICE — PENCL ROCKRSWCH MEGADYNE W/HOLSTR 10FT SS

## (undated) DEVICE — GOWN,REINFORCE,POLY,SIRUS,BREATH SLV,XLG: Brand: MEDLINE

## (undated) DEVICE — MARYLAND JAW LAPAROSCOPIC SEALER/DIVIDER COATED: Brand: LIGASURE

## (undated) DEVICE — PREMIUM DRY TRAY LF: Brand: MEDLINE INDUSTRIES, INC.

## (undated) DEVICE — DRSNG WND BORDR/ADHS NONADHR/GZ LF 4X4IN STRL

## (undated) DEVICE — SILICONE CLAMP COVERS, STERILE, BLUE, 0.29" (7.40MM) X 5", 2/PKG: Brand: KEY SURGICAL SILICONE CLAMP COVERS

## (undated) DEVICE — GLV SURG SENSICARE PI ORTHO PF SZ7 LF STRL

## (undated) DEVICE — MEDI-VAC NON-CONDUCTIVE SUCTION TUBING: Brand: CARDINAL HEALTH

## (undated) DEVICE — TUBING, SUCTION, 1/4" X 10', STRAIGHT: Brand: MEDLINE

## (undated) DEVICE — LEGGINGS, PAIR, 29X43, STERILE: Brand: MEDLINE

## (undated) DEVICE — ADHS SKIN PREMIERPRO EXOFIN TOPICAL HI/VISC .5ML

## (undated) DEVICE — THE ECHELON FLEX POWERED PLUS ARTICULATING ENDOSCOPIC LINEAR CUTTERS ARE STERILE, SINGLE PATIENT USE INSTRUMENTS THAT SIMULTANEOUSLYCUT AND STAPLE TISSUE. THERE ARE SIX STAGGERED ROWS OF STAPLES, THREE ON EITHER SIDE OF THE CUT LINE. THE ECHELON FLEX 45 POWERED PLUSINSTRUMENTS HAVE A STAPLE LINE THAT IS APPROXIMATELY 45 MM LONG AND A CUT LINE THAT IS APPROXIMATELY 42 MM LONG. THE SHAFT CAN ROTATE FREELYIN BOTH DIRECTIONS AND AN ARTICULATION MECHANISM ENABLES THE DISTAL PORTION OF THE SHAFT TO PIVOT TO FACILITATE LATERAL ACCESS TO THE OPERATIVESITE.THE INSTRUMENTS ARE PACKAGED WITH A PRIMARY LITHIUM BATTERY PACK THAT MUST BE INSTALLED PRIOR TO USE. THERE ARE SPECIFIC REQUIREMENTS FORDISPOSING OF THE BATTERY PACK. REFER TO THE BATTERY PACK DISPOSAL SECTION.THE INSTRUMENTS ARE PACKAGED WITHOUT A RELOAD AND MUST BE LOADED PRIOR TO USE. A STAPLE RETAINING CAP ON THE RELOAD PROTECTS THE STAPLE LEGPOINTS DURING SHIPPING AND TRANSPORTATION. THE INSTRUMENTS’ LOCK-OUT FEATURE IS DESIGNED TO PREVENT A USED OR IMPROPERLY INSTALLED RELOADFROM BEING REFIRED OR AN INSTRUMENT FROM BEING FIRED WITHOUT A RELOAD.: Brand: ECHELON FLEX

## (undated) DEVICE — SUT VIC 0 TIES 18IN J912G

## (undated) DEVICE — SUT PDS 1 CTX 36IN VIO PDP371T

## (undated) DEVICE — VISUALIZATION SYSTEM: Brand: CLEARIFY

## (undated) DEVICE — ENDOPATH XCEL UNIVERSAL TROCAR STABLILITY SLEEVES: Brand: ENDOPATH XCEL

## (undated) DEVICE — DRSNG WND BORDR/ADHS NONADHR/GZ LF 2X2IN STRL

## (undated) DEVICE — LAP PORT CLOSURE GUIDES 5MM AND 10/12MM: Brand: LAP PORT CLOSURE GUIDES 5MM AND 10/12MM

## (undated) DEVICE — MEDI-VAC YANKAUER SUCTION HANDLE W/BULBOUS TIP: Brand: CARDINAL HEALTH

## (undated) DEVICE — ACCESS PLATFORM FOR MINIMALLY INVASIVE SURGERY.: Brand: GELPORT® LAPAROSCOPIC  SYSTEM

## (undated) DEVICE — DRAPE,UNDERBUTTOCKS,PCH,STERILE: Brand: MEDLINE

## (undated) DEVICE — CATH FOL COUNCL 2WY 20F 5CC

## (undated) DEVICE — SUT VIC 0 CTD BR 18IN UNDYE VCP724D

## (undated) DEVICE — Device

## (undated) DEVICE — ENDOPATH XCEL BLADELESS TROCARS WITH STABILITY SLEEVES: Brand: ENDOPATH XCEL

## (undated) DEVICE — MEDI-VAC YANKAUER SUCTION HANDLE: Brand: CARDINAL HEALTH

## (undated) DEVICE — SUT PROLN 2/0 KS 30IN 8623H

## (undated) DEVICE — KT POSTN TRENDELENBURG NBXL PAD WING STRAP TABL HDRST XLG

## (undated) DEVICE — TOWEL,OR,DSP,ST,BLUE,STD,4/PK,20PK/CS: Brand: MEDLINE

## (undated) DEVICE — APPL CHLORAPREP TINTED 26ML TEAL

## (undated) DEVICE — GLV SURG SENSICARE PI MIC PF SZ6.5 LF STRL

## (undated) DEVICE — CATH URETRL FLXITP POLLACK STD 5F 70CM

## (undated) DEVICE — MONOPOLAR METZENBAUM SCISSOR TIP, DISPOSABLE: Brand: MONOPOLAR METZENBAUM SCISSOR TIP, DISPOSABLE